# Patient Record
Sex: FEMALE | Race: WHITE | NOT HISPANIC OR LATINO | URBAN - METROPOLITAN AREA
[De-identification: names, ages, dates, MRNs, and addresses within clinical notes are randomized per-mention and may not be internally consistent; named-entity substitution may affect disease eponyms.]

---

## 2023-10-06 ENCOUNTER — OUTPATIENT (OUTPATIENT)
Dept: OUTPATIENT SERVICES | Facility: HOSPITAL | Age: 33
LOS: 1 days | End: 2023-10-06
Payer: COMMERCIAL

## 2023-10-06 VITALS
TEMPERATURE: 98 F | SYSTOLIC BLOOD PRESSURE: 105 MMHG | WEIGHT: 203.05 LBS | OXYGEN SATURATION: 97 % | DIASTOLIC BLOOD PRESSURE: 67 MMHG | RESPIRATION RATE: 17 BRPM | HEIGHT: 64 IN | HEART RATE: 96 BPM

## 2023-10-06 DIAGNOSIS — O02.1 MISSED ABORTION: ICD-10-CM

## 2023-10-06 DIAGNOSIS — Z01.818 ENCOUNTER FOR OTHER PREPROCEDURAL EXAMINATION: ICD-10-CM

## 2023-10-06 DIAGNOSIS — Z98.890 OTHER SPECIFIED POSTPROCEDURAL STATES: Chronic | ICD-10-CM

## 2023-10-06 DIAGNOSIS — K08.409 PARTIAL LOSS OF TEETH, UNSPECIFIED CAUSE, UNSPECIFIED CLASS: Chronic | ICD-10-CM

## 2023-10-06 LAB
ANION GAP SERPL CALC-SCNC: 13 MMOL/L — SIGNIFICANT CHANGE UP (ref 5–17)
APTT BLD: 35.1 SEC — SIGNIFICANT CHANGE UP (ref 24.5–35.6)
BLD GP AB SCN SERPL QL: NEGATIVE — SIGNIFICANT CHANGE UP
BUN SERPL-MCNC: 7 MG/DL — SIGNIFICANT CHANGE UP (ref 7–23)
CALCIUM SERPL-MCNC: 9.9 MG/DL — SIGNIFICANT CHANGE UP (ref 8.4–10.5)
CHLORIDE SERPL-SCNC: 103 MMOL/L — SIGNIFICANT CHANGE UP (ref 96–108)
CO2 SERPL-SCNC: 20 MMOL/L — LOW (ref 22–31)
CREAT SERPL-MCNC: 0.49 MG/DL — LOW (ref 0.5–1.3)
EGFR: 129 ML/MIN/1.73M2 — SIGNIFICANT CHANGE UP
FIBRINOGEN PPP-MCNC: 424 MG/DL — SIGNIFICANT CHANGE UP (ref 200–445)
GLUCOSE SERPL-MCNC: 90 MG/DL — SIGNIFICANT CHANGE UP (ref 70–99)
HCT VFR BLD CALC: 38.7 % — SIGNIFICANT CHANGE UP (ref 34.5–45)
HGB BLD-MCNC: 13.4 G/DL — SIGNIFICANT CHANGE UP (ref 11.5–15.5)
INR BLD: 1.1 RATIO — SIGNIFICANT CHANGE UP (ref 0.85–1.18)
MCHC RBC-ENTMCNC: 31.7 PG — SIGNIFICANT CHANGE UP (ref 27–34)
MCHC RBC-ENTMCNC: 34.6 GM/DL — SIGNIFICANT CHANGE UP (ref 32–36)
MCV RBC AUTO: 91.5 FL — SIGNIFICANT CHANGE UP (ref 80–100)
NRBC # BLD: 0 /100 WBCS — SIGNIFICANT CHANGE UP (ref 0–0)
PLATELET # BLD AUTO: 334 K/UL — SIGNIFICANT CHANGE UP (ref 150–400)
POTASSIUM SERPL-MCNC: 3.8 MMOL/L — SIGNIFICANT CHANGE UP (ref 3.5–5.3)
POTASSIUM SERPL-SCNC: 3.8 MMOL/L — SIGNIFICANT CHANGE UP (ref 3.5–5.3)
PROTHROM AB SERPL-ACNC: 11.5 SEC — SIGNIFICANT CHANGE UP (ref 9.5–13)
RBC # BLD: 4.23 M/UL — SIGNIFICANT CHANGE UP (ref 3.8–5.2)
RBC # FLD: 11.9 % — SIGNIFICANT CHANGE UP (ref 10.3–14.5)
RH IG SCN BLD-IMP: POSITIVE — SIGNIFICANT CHANGE UP
SODIUM SERPL-SCNC: 136 MMOL/L — SIGNIFICANT CHANGE UP (ref 135–145)
WBC # BLD: 9.08 K/UL — SIGNIFICANT CHANGE UP (ref 3.8–10.5)
WBC # FLD AUTO: 9.08 K/UL — SIGNIFICANT CHANGE UP (ref 3.8–10.5)

## 2023-10-06 PROCEDURE — 36415 COLL VENOUS BLD VENIPUNCTURE: CPT

## 2023-10-06 PROCEDURE — 85027 COMPLETE CBC AUTOMATED: CPT

## 2023-10-06 PROCEDURE — 80048 BASIC METABOLIC PNL TOTAL CA: CPT

## 2023-10-06 PROCEDURE — 85384 FIBRINOGEN ACTIVITY: CPT

## 2023-10-06 PROCEDURE — G0463: CPT

## 2023-10-06 PROCEDURE — 86901 BLOOD TYPING SEROLOGIC RH(D): CPT

## 2023-10-06 PROCEDURE — 85610 PROTHROMBIN TIME: CPT

## 2023-10-06 PROCEDURE — 85730 THROMBOPLASTIN TIME PARTIAL: CPT

## 2023-10-06 PROCEDURE — 86900 BLOOD TYPING SEROLOGIC ABO: CPT

## 2023-10-06 PROCEDURE — 86850 RBC ANTIBODY SCREEN: CPT

## 2023-10-06 RX ORDER — SODIUM CHLORIDE 9 MG/ML
1000 INJECTION, SOLUTION INTRAVENOUS
Refills: 0 | Status: DISCONTINUED | OUTPATIENT
Start: 2023-10-10 | End: 2023-10-24

## 2023-10-06 RX ORDER — SODIUM CHLORIDE 9 MG/ML
3 INJECTION INTRAMUSCULAR; INTRAVENOUS; SUBCUTANEOUS EVERY 8 HOURS
Refills: 0 | Status: DISCONTINUED | OUTPATIENT
Start: 2023-10-10 | End: 2023-10-24

## 2023-10-06 NOTE — H&P PST ADULT - ASSESSMENT
DASI score:  DASI activity:  Loose teeth or denture: denies DASI score: 8 mets  DASI activity: ADLs, walking, stairs   Loose teeth or denture: denies

## 2023-10-06 NOTE — H&P PST ADULT - PROBLEM SELECTOR PLAN 1
D&C FOR MISSED AB  Pre-op education provided - all questions answered   Per guideline CBC, T&S, coags sent in PST

## 2023-10-06 NOTE — H&P PST ADULT - HISTORY OF PRESENT ILLNESS
34 YO G P female presents to PST for D&C MISSED AB 10/10/2023. Denies any palpitations, SOB, N/V, fever or chills.  34 YO  female LMP 2023 @ 14w gestation, u/s performed 10/3 revealed no fetal heartbeat, pt stated "they think the baby passed at 11w," presents to PST for D&C MISSED AB 10/10/2023. Denies any palpitations, SOB, N/V, fever or chills.

## 2023-10-06 NOTE — H&P PST ADULT - ENMT
Neuros: A&Ox4, cooperative with cares.   Cardiac: WNL, denies chest pain.   Respiratory: On RA denies SOB.   Pain: Denied  Nausea: Denies N/V  Diet: Regular diet, 1500 Fluid restriction. Good appetite  GI/: Per patient doesn't void  Skin/Incisions/Drains: Orders for wound change. Patient eating deferred.   Lines: L PIV SL  Labs: hgb was 9.0  Activity: Activity encouraged, Up independent. Ambulate to chair and back to bed.    New Changes: Dialysis 3L out today  Plan: Continue POC.    negative mouth

## 2023-10-06 NOTE — H&P PST ADULT - NSICDXPASTSURGICALHX_GEN_ALL_CORE_FT
PAST SURGICAL HISTORY:  One previous induced termination of pregnancy     [place] teeth extracted

## 2023-10-10 ENCOUNTER — OUTPATIENT (OUTPATIENT)
Dept: OUTPATIENT SERVICES | Facility: HOSPITAL | Age: 33
LOS: 1 days | End: 2023-10-10
Payer: COMMERCIAL

## 2023-10-10 VITALS
HEIGHT: 64 IN | OXYGEN SATURATION: 99 % | DIASTOLIC BLOOD PRESSURE: 83 MMHG | RESPIRATION RATE: 18 BRPM | HEART RATE: 95 BPM | SYSTOLIC BLOOD PRESSURE: 135 MMHG | WEIGHT: 203.05 LBS | TEMPERATURE: 98 F

## 2023-10-10 VITALS
HEART RATE: 76 BPM | RESPIRATION RATE: 17 BRPM | OXYGEN SATURATION: 97 % | DIASTOLIC BLOOD PRESSURE: 77 MMHG | SYSTOLIC BLOOD PRESSURE: 117 MMHG | TEMPERATURE: 97 F

## 2023-10-10 DIAGNOSIS — K08.409 PARTIAL LOSS OF TEETH, UNSPECIFIED CAUSE, UNSPECIFIED CLASS: Chronic | ICD-10-CM

## 2023-10-10 DIAGNOSIS — Z98.890 OTHER SPECIFIED POSTPROCEDURAL STATES: Chronic | ICD-10-CM

## 2023-10-10 DIAGNOSIS — O02.1 MISSED ABORTION: ICD-10-CM

## 2023-10-10 PROCEDURE — 86900 BLOOD TYPING SEROLOGIC ABO: CPT

## 2023-10-10 PROCEDURE — 59820 CARE OF MISCARRIAGE: CPT

## 2023-10-10 PROCEDURE — 88305 TISSUE EXAM BY PATHOLOGIST: CPT

## 2023-10-10 PROCEDURE — 88233 TISSUE CULTURE SKIN/BIOPSY: CPT

## 2023-10-10 PROCEDURE — 88291 CYTO/MOLECULAR REPORT: CPT | Mod: 1L

## 2023-10-10 PROCEDURE — 86901 BLOOD TYPING SEROLOGIC RH(D): CPT

## 2023-10-10 PROCEDURE — 86850 RBC ANTIBODY SCREEN: CPT

## 2023-10-10 PROCEDURE — 88280 CHROMOSOME KARYOTYPE STUDY: CPT

## 2023-10-10 PROCEDURE — 88264 CHROMOSOME ANALYSIS 20-25: CPT

## 2023-10-10 PROCEDURE — 88305 TISSUE EXAM BY PATHOLOGIST: CPT | Mod: 26

## 2023-10-10 RX ORDER — LIDOCAINE HCL 20 MG/ML
0.2 VIAL (ML) INJECTION ONCE
Refills: 0 | Status: COMPLETED | OUTPATIENT
Start: 2023-10-10 | End: 2023-10-10

## 2023-10-10 RX ORDER — OXYCODONE HYDROCHLORIDE 5 MG/1
5 TABLET ORAL ONCE
Refills: 0 | Status: DISCONTINUED | OUTPATIENT
Start: 2023-10-10 | End: 2023-10-10

## 2023-10-10 RX ORDER — ONDANSETRON 8 MG/1
4 TABLET, FILM COATED ORAL ONCE
Refills: 0 | Status: DISCONTINUED | OUTPATIENT
Start: 2023-10-10 | End: 2023-10-24

## 2023-10-10 RX ORDER — IBUPROFEN 200 MG
1 TABLET ORAL
Qty: 0 | Refills: 0 | DISCHARGE

## 2023-10-10 RX ORDER — ACETAMINOPHEN 500 MG
3 TABLET ORAL
Qty: 0 | Refills: 0 | DISCHARGE

## 2023-10-10 RX ORDER — BACILLUS COAGULANS/INULIN 1B-250 MG
1 CAPSULE ORAL
Refills: 0 | DISCHARGE

## 2023-10-10 RX ADMIN — OXYCODONE HYDROCHLORIDE 5 MILLIGRAM(S): 5 TABLET ORAL at 12:57

## 2023-10-10 RX ADMIN — SODIUM CHLORIDE 100 MILLILITER(S): 9 INJECTION, SOLUTION INTRAVENOUS at 11:15

## 2023-10-10 RX ADMIN — OXYCODONE HYDROCHLORIDE 5 MILLIGRAM(S): 5 TABLET ORAL at 12:37

## 2023-10-10 NOTE — ASU DISCHARGE PLAN (ADULT/PEDIATRIC) - CARE PROVIDER_API CALL
Tyrese Martniez  Obstetrics and Gynecology  [address] [place] [address]  Phone: [phone]  Fax: [phone]  Follow Up Time: 2 weeks

## 2023-10-10 NOTE — BRIEF OPERATIVE NOTE - NSICDXBRIEFPROCEDURE_GEN_ALL_CORE_FT
PROCEDURES:  Hysteroscopy, with cervical dilation of and curettage procedure, using suction 10-Oct-2023 12:38:18  Zabrina Hicks

## 2023-10-10 NOTE — ASU DISCHARGE PLAN (ADULT/PEDIATRIC) - MEDICATION INSTRUCTIONS
Every 3 hours alternate taking ibuprofen and tylenol as needed for pain. Do not take more than 2 doses of the same medication in a 6 hour period

## 2023-10-10 NOTE — ASU DISCHARGE PLAN (ADULT/PEDIATRIC) - ASU DC SPECIAL INSTRUCTIONSFT
Expect abdominal cramping/pain and spotting for the next weeks. Take ibuprofen and Tylenol for cramping. Use a pad as needed. Call your physician or go to the emergency room if you experience any of the following: heavy vaginal bleeding (soaking more than 2 pads in 1 hour for 2 hours), fever, chills, nausea, vomiting, or pain that is not controlled by medication. Follow-up with Dr. Martinez in 2 weeks.

## 2023-10-10 NOTE — ASU PREOP CHECKLIST - NSSDAENDDT_GEN_ALL_CORE
You were seen in the emergency department for a hemorrhoid. Please see the attached sheet for more information.    Please follow-up with the colorectal surgeon.    You can use 500-1000mg Tylenol every 6 hours for pain - as needed.  This is an over-the-counter medications - please respect the warnings on the label. This medication come with certain risks and side effects that you need to discuss with your doctor, especially if you are taking it for a prolonged period.    Return to the ED if you have worsening pain, increased bleeding, fevers, unable to have a bowel movement, or unable to tolerate food or liquids.
10-Oct-2023 11:36

## 2023-10-10 NOTE — ASU DISCHARGE PLAN (ADULT/PEDIATRIC) - NS MD DC FALL RISK RISK
For information on Fall & Injury Prevention, visit: [email or web address] OR  [email or web address] OR  https://www.cdc.gov/steadi/patient.html

## 2023-10-10 NOTE — ASU PATIENT PROFILE, ADULT - NSICDXPASTSURGICALHX_GEN_ALL_CORE_FT
PAST SURGICAL HISTORY:  One previous induced termination of pregnancy     [place] teeth extracted

## 2023-10-16 LAB — SURGICAL PATHOLOGY STUDY: SIGNIFICANT CHANGE UP

## 2023-10-27 LAB
CHROM ANALY OVERALL INTERP SPEC-IMP: SIGNIFICANT CHANGE UP
CHROM ANALY OVERALL INTERP SPEC-IMP: SIGNIFICANT CHANGE UP

## 2023-11-01 ENCOUNTER — EMERGENCY (EMERGENCY)
Facility: HOSPITAL | Age: 33
LOS: 1 days | Discharge: HOME | End: 2023-11-01
Attending: EMERGENCY MEDICINE
Payer: COMMERCIAL

## 2023-11-01 VITALS
HEIGHT: 64 IN | OXYGEN SATURATION: 98 % | HEART RATE: 92 BPM | SYSTOLIC BLOOD PRESSURE: 131 MMHG | TEMPERATURE: 98 F | WEIGHT: 199.96 LBS | DIASTOLIC BLOOD PRESSURE: 80 MMHG | RESPIRATION RATE: 20 BRPM

## 2023-11-01 DIAGNOSIS — Z98.890 OTHER SPECIFIED POSTPROCEDURAL STATES: Chronic | ICD-10-CM

## 2023-11-01 DIAGNOSIS — K08.409 PARTIAL LOSS OF TEETH, UNSPECIFIED CAUSE, UNSPECIFIED CLASS: Chronic | ICD-10-CM

## 2023-11-01 PROCEDURE — 99285 EMERGENCY DEPT VISIT HI MDM: CPT

## 2023-11-02 VITALS
HEART RATE: 85 BPM | DIASTOLIC BLOOD PRESSURE: 72 MMHG | OXYGEN SATURATION: 100 % | RESPIRATION RATE: 18 BRPM | SYSTOLIC BLOOD PRESSURE: 112 MMHG | TEMPERATURE: 98 F

## 2023-11-02 PROBLEM — F90.9 ATTENTION-DEFICIT HYPERACTIVITY DISORDER, UNSPECIFIED TYPE: Chronic | Status: ACTIVE | Noted: 2023-10-06

## 2023-11-02 PROBLEM — Z34.90 ENCOUNTER FOR SUPERVISION OF NORMAL PREGNANCY, UNSPECIFIED, UNSPECIFIED TRIMESTER: Chronic | Status: ACTIVE | Noted: 2023-10-06

## 2023-11-02 LAB
ALBUMIN SERPL ELPH-MCNC: 4.4 G/DL — SIGNIFICANT CHANGE UP (ref 3.3–5)
ALBUMIN SERPL ELPH-MCNC: 4.4 G/DL — SIGNIFICANT CHANGE UP (ref 3.3–5)
ALP SERPL-CCNC: 97 U/L — SIGNIFICANT CHANGE UP (ref 40–120)
ALP SERPL-CCNC: 97 U/L — SIGNIFICANT CHANGE UP (ref 40–120)
ALT FLD-CCNC: 10 U/L — SIGNIFICANT CHANGE UP (ref 10–45)
ALT FLD-CCNC: 10 U/L — SIGNIFICANT CHANGE UP (ref 10–45)
ANION GAP SERPL CALC-SCNC: 12 MMOL/L — SIGNIFICANT CHANGE UP (ref 5–17)
ANION GAP SERPL CALC-SCNC: 12 MMOL/L — SIGNIFICANT CHANGE UP (ref 5–17)
APPEARANCE UR: ABNORMAL
APPEARANCE UR: ABNORMAL
APPEARANCE UR: CLEAR — SIGNIFICANT CHANGE UP
APPEARANCE UR: CLEAR — SIGNIFICANT CHANGE UP
AST SERPL-CCNC: 15 U/L — SIGNIFICANT CHANGE UP (ref 10–40)
AST SERPL-CCNC: 15 U/L — SIGNIFICANT CHANGE UP (ref 10–40)
BACTERIA # UR AUTO: ABNORMAL /HPF
BASOPHILS # BLD AUTO: 0.04 K/UL — SIGNIFICANT CHANGE UP (ref 0–0.2)
BASOPHILS # BLD AUTO: 0.04 K/UL — SIGNIFICANT CHANGE UP (ref 0–0.2)
BASOPHILS NFR BLD AUTO: 0.3 % — SIGNIFICANT CHANGE UP (ref 0–2)
BASOPHILS NFR BLD AUTO: 0.3 % — SIGNIFICANT CHANGE UP (ref 0–2)
BILIRUB SERPL-MCNC: 0.3 MG/DL — SIGNIFICANT CHANGE UP (ref 0.2–1.2)
BILIRUB SERPL-MCNC: 0.3 MG/DL — SIGNIFICANT CHANGE UP (ref 0.2–1.2)
BILIRUB UR-MCNC: NEGATIVE — SIGNIFICANT CHANGE UP
BLD GP AB SCN SERPL QL: NEGATIVE — SIGNIFICANT CHANGE UP
BLD GP AB SCN SERPL QL: NEGATIVE — SIGNIFICANT CHANGE UP
BUN SERPL-MCNC: 11 MG/DL — SIGNIFICANT CHANGE UP (ref 7–23)
BUN SERPL-MCNC: 11 MG/DL — SIGNIFICANT CHANGE UP (ref 7–23)
CALCIUM SERPL-MCNC: 9.5 MG/DL — SIGNIFICANT CHANGE UP (ref 8.4–10.5)
CALCIUM SERPL-MCNC: 9.5 MG/DL — SIGNIFICANT CHANGE UP (ref 8.4–10.5)
CAST: 0 /LPF — SIGNIFICANT CHANGE UP (ref 0–4)
CAST: 0 /LPF — SIGNIFICANT CHANGE UP (ref 0–4)
CAST: 2 /LPF — SIGNIFICANT CHANGE UP (ref 0–4)
CAST: 2 /LPF — SIGNIFICANT CHANGE UP (ref 0–4)
CHLORIDE SERPL-SCNC: 103 MMOL/L — SIGNIFICANT CHANGE UP (ref 96–108)
CHLORIDE SERPL-SCNC: 103 MMOL/L — SIGNIFICANT CHANGE UP (ref 96–108)
CO2 SERPL-SCNC: 24 MMOL/L — SIGNIFICANT CHANGE UP (ref 22–31)
CO2 SERPL-SCNC: 24 MMOL/L — SIGNIFICANT CHANGE UP (ref 22–31)
COLOR SPEC: YELLOW — SIGNIFICANT CHANGE UP
CREAT SERPL-MCNC: 0.72 MG/DL — SIGNIFICANT CHANGE UP (ref 0.5–1.3)
CREAT SERPL-MCNC: 0.72 MG/DL — SIGNIFICANT CHANGE UP (ref 0.5–1.3)
DIFF PNL FLD: ABNORMAL
EGFR: 115 ML/MIN/1.73M2 — SIGNIFICANT CHANGE UP
EGFR: 115 ML/MIN/1.73M2 — SIGNIFICANT CHANGE UP
EOSINOPHIL # BLD AUTO: 0.17 K/UL — SIGNIFICANT CHANGE UP (ref 0–0.5)
EOSINOPHIL # BLD AUTO: 0.17 K/UL — SIGNIFICANT CHANGE UP (ref 0–0.5)
EOSINOPHIL NFR BLD AUTO: 1.4 % — SIGNIFICANT CHANGE UP (ref 0–6)
EOSINOPHIL NFR BLD AUTO: 1.4 % — SIGNIFICANT CHANGE UP (ref 0–6)
GLUCOSE SERPL-MCNC: 102 MG/DL — HIGH (ref 70–99)
GLUCOSE SERPL-MCNC: 102 MG/DL — HIGH (ref 70–99)
GLUCOSE UR QL: NEGATIVE MG/DL — SIGNIFICANT CHANGE UP
HCG SERPL-ACNC: 2 MIU/ML — SIGNIFICANT CHANGE UP
HCG SERPL-ACNC: 2 MIU/ML — SIGNIFICANT CHANGE UP
HCT VFR BLD CALC: 36.2 % — SIGNIFICANT CHANGE UP (ref 34.5–45)
HCT VFR BLD CALC: 36.2 % — SIGNIFICANT CHANGE UP (ref 34.5–45)
HCT VFR BLD CALC: 37.4 % — SIGNIFICANT CHANGE UP (ref 34.5–45)
HCT VFR BLD CALC: 37.4 % — SIGNIFICANT CHANGE UP (ref 34.5–45)
HGB BLD-MCNC: 12.2 G/DL — SIGNIFICANT CHANGE UP (ref 11.5–15.5)
HGB BLD-MCNC: 12.2 G/DL — SIGNIFICANT CHANGE UP (ref 11.5–15.5)
HGB BLD-MCNC: 12.5 G/DL — SIGNIFICANT CHANGE UP (ref 11.5–15.5)
HGB BLD-MCNC: 12.5 G/DL — SIGNIFICANT CHANGE UP (ref 11.5–15.5)
IMM GRANULOCYTES NFR BLD AUTO: 0.2 % — SIGNIFICANT CHANGE UP (ref 0–0.9)
IMM GRANULOCYTES NFR BLD AUTO: 0.2 % — SIGNIFICANT CHANGE UP (ref 0–0.9)
KETONES UR-MCNC: NEGATIVE MG/DL — SIGNIFICANT CHANGE UP
LEUKOCYTE ESTERASE UR-ACNC: ABNORMAL
LEUKOCYTE ESTERASE UR-ACNC: ABNORMAL
LEUKOCYTE ESTERASE UR-ACNC: NEGATIVE — SIGNIFICANT CHANGE UP
LEUKOCYTE ESTERASE UR-ACNC: NEGATIVE — SIGNIFICANT CHANGE UP
LIDOCAIN IGE QN: 16 U/L — SIGNIFICANT CHANGE UP (ref 7–60)
LIDOCAIN IGE QN: 16 U/L — SIGNIFICANT CHANGE UP (ref 7–60)
LYMPHOCYTES # BLD AUTO: 27 % — SIGNIFICANT CHANGE UP (ref 13–44)
LYMPHOCYTES # BLD AUTO: 27 % — SIGNIFICANT CHANGE UP (ref 13–44)
LYMPHOCYTES # BLD AUTO: 3.29 K/UL — SIGNIFICANT CHANGE UP (ref 1–3.3)
LYMPHOCYTES # BLD AUTO: 3.29 K/UL — SIGNIFICANT CHANGE UP (ref 1–3.3)
MCHC RBC-ENTMCNC: 31.1 PG — SIGNIFICANT CHANGE UP (ref 27–34)
MCHC RBC-ENTMCNC: 31.1 PG — SIGNIFICANT CHANGE UP (ref 27–34)
MCHC RBC-ENTMCNC: 31.3 PG — SIGNIFICANT CHANGE UP (ref 27–34)
MCHC RBC-ENTMCNC: 31.3 PG — SIGNIFICANT CHANGE UP (ref 27–34)
MCHC RBC-ENTMCNC: 33.4 GM/DL — SIGNIFICANT CHANGE UP (ref 32–36)
MCHC RBC-ENTMCNC: 33.4 GM/DL — SIGNIFICANT CHANGE UP (ref 32–36)
MCHC RBC-ENTMCNC: 33.7 GM/DL — SIGNIFICANT CHANGE UP (ref 32–36)
MCHC RBC-ENTMCNC: 33.7 GM/DL — SIGNIFICANT CHANGE UP (ref 32–36)
MCV RBC AUTO: 92.3 FL — SIGNIFICANT CHANGE UP (ref 80–100)
MCV RBC AUTO: 92.3 FL — SIGNIFICANT CHANGE UP (ref 80–100)
MCV RBC AUTO: 93.7 FL — SIGNIFICANT CHANGE UP (ref 80–100)
MCV RBC AUTO: 93.7 FL — SIGNIFICANT CHANGE UP (ref 80–100)
MONOCYTES # BLD AUTO: 0.94 K/UL — HIGH (ref 0–0.9)
MONOCYTES # BLD AUTO: 0.94 K/UL — HIGH (ref 0–0.9)
MONOCYTES NFR BLD AUTO: 7.7 % — SIGNIFICANT CHANGE UP (ref 2–14)
MONOCYTES NFR BLD AUTO: 7.7 % — SIGNIFICANT CHANGE UP (ref 2–14)
NEUTROPHILS # BLD AUTO: 7.71 K/UL — HIGH (ref 1.8–7.4)
NEUTROPHILS # BLD AUTO: 7.71 K/UL — HIGH (ref 1.8–7.4)
NEUTROPHILS NFR BLD AUTO: 63.4 % — SIGNIFICANT CHANGE UP (ref 43–77)
NEUTROPHILS NFR BLD AUTO: 63.4 % — SIGNIFICANT CHANGE UP (ref 43–77)
NITRITE UR-MCNC: NEGATIVE — SIGNIFICANT CHANGE UP
NRBC # BLD: 0 /100 WBCS — SIGNIFICANT CHANGE UP (ref 0–0)
PH UR: 5.5 — SIGNIFICANT CHANGE UP (ref 5–8)
PLATELET # BLD AUTO: 312 K/UL — SIGNIFICANT CHANGE UP (ref 150–400)
PLATELET # BLD AUTO: 312 K/UL — SIGNIFICANT CHANGE UP (ref 150–400)
PLATELET # BLD AUTO: 328 K/UL — SIGNIFICANT CHANGE UP (ref 150–400)
PLATELET # BLD AUTO: 328 K/UL — SIGNIFICANT CHANGE UP (ref 150–400)
POTASSIUM SERPL-MCNC: 3.8 MMOL/L — SIGNIFICANT CHANGE UP (ref 3.5–5.3)
POTASSIUM SERPL-MCNC: 3.8 MMOL/L — SIGNIFICANT CHANGE UP (ref 3.5–5.3)
POTASSIUM SERPL-SCNC: 3.8 MMOL/L — SIGNIFICANT CHANGE UP (ref 3.5–5.3)
POTASSIUM SERPL-SCNC: 3.8 MMOL/L — SIGNIFICANT CHANGE UP (ref 3.5–5.3)
PROT SERPL-MCNC: 7.1 G/DL — SIGNIFICANT CHANGE UP (ref 6–8.3)
PROT SERPL-MCNC: 7.1 G/DL — SIGNIFICANT CHANGE UP (ref 6–8.3)
PROT UR-MCNC: 30 MG/DL
PROT UR-MCNC: 30 MG/DL
PROT UR-MCNC: NEGATIVE MG/DL — SIGNIFICANT CHANGE UP
PROT UR-MCNC: NEGATIVE MG/DL — SIGNIFICANT CHANGE UP
RBC # BLD: 3.92 M/UL — SIGNIFICANT CHANGE UP (ref 3.8–5.2)
RBC # BLD: 3.92 M/UL — SIGNIFICANT CHANGE UP (ref 3.8–5.2)
RBC # BLD: 3.99 M/UL — SIGNIFICANT CHANGE UP (ref 3.8–5.2)
RBC # BLD: 3.99 M/UL — SIGNIFICANT CHANGE UP (ref 3.8–5.2)
RBC # FLD: 12 % — SIGNIFICANT CHANGE UP (ref 10.3–14.5)
RBC # FLD: 12 % — SIGNIFICANT CHANGE UP (ref 10.3–14.5)
RBC # FLD: 12.2 % — SIGNIFICANT CHANGE UP (ref 10.3–14.5)
RBC # FLD: 12.2 % — SIGNIFICANT CHANGE UP (ref 10.3–14.5)
RBC CASTS # UR COMP ASSIST: 26 /HPF — HIGH (ref 0–4)
RBC CASTS # UR COMP ASSIST: 26 /HPF — HIGH (ref 0–4)
RBC CASTS # UR COMP ASSIST: 6 /HPF — HIGH (ref 0–4)
RBC CASTS # UR COMP ASSIST: 6 /HPF — HIGH (ref 0–4)
REVIEW: SIGNIFICANT CHANGE UP
RH IG SCN BLD-IMP: POSITIVE — SIGNIFICANT CHANGE UP
RH IG SCN BLD-IMP: POSITIVE — SIGNIFICANT CHANGE UP
SODIUM SERPL-SCNC: 139 MMOL/L — SIGNIFICANT CHANGE UP (ref 135–145)
SODIUM SERPL-SCNC: 139 MMOL/L — SIGNIFICANT CHANGE UP (ref 135–145)
SP GR SPEC: 1.02 — SIGNIFICANT CHANGE UP (ref 1–1.03)
SP GR SPEC: 1.02 — SIGNIFICANT CHANGE UP (ref 1–1.03)
SP GR SPEC: >1.05 — SIGNIFICANT CHANGE UP (ref 1–1.03)
SP GR SPEC: >1.05 — SIGNIFICANT CHANGE UP (ref 1–1.03)
SQUAMOUS # UR AUTO: 10 /HPF — HIGH (ref 0–5)
SQUAMOUS # UR AUTO: 10 /HPF — HIGH (ref 0–5)
SQUAMOUS # UR AUTO: 4 /HPF — SIGNIFICANT CHANGE UP (ref 0–5)
SQUAMOUS # UR AUTO: 4 /HPF — SIGNIFICANT CHANGE UP (ref 0–5)
UROBILINOGEN FLD QL: 0.2 MG/DL — SIGNIFICANT CHANGE UP (ref 0.2–1)
WBC # BLD: 12.18 K/UL — HIGH (ref 3.8–10.5)
WBC # BLD: 12.18 K/UL — HIGH (ref 3.8–10.5)
WBC # BLD: 9.15 K/UL — SIGNIFICANT CHANGE UP (ref 3.8–10.5)
WBC # BLD: 9.15 K/UL — SIGNIFICANT CHANGE UP (ref 3.8–10.5)
WBC # FLD AUTO: 12.18 K/UL — HIGH (ref 3.8–10.5)
WBC # FLD AUTO: 12.18 K/UL — HIGH (ref 3.8–10.5)
WBC # FLD AUTO: 9.15 K/UL — SIGNIFICANT CHANGE UP (ref 3.8–10.5)
WBC # FLD AUTO: 9.15 K/UL — SIGNIFICANT CHANGE UP (ref 3.8–10.5)
WBC UR QL: 12 /HPF — HIGH (ref 0–5)
WBC UR QL: 12 /HPF — HIGH (ref 0–5)
WBC UR QL: 6 /HPF — HIGH (ref 0–5)
WBC UR QL: 6 /HPF — HIGH (ref 0–5)

## 2023-11-02 PROCEDURE — 86900 BLOOD TYPING SEROLOGIC ABO: CPT

## 2023-11-02 PROCEDURE — 36415 COLL VENOUS BLD VENIPUNCTURE: CPT

## 2023-11-02 PROCEDURE — 87086 URINE CULTURE/COLONY COUNT: CPT

## 2023-11-02 PROCEDURE — 85027 COMPLETE CBC AUTOMATED: CPT

## 2023-11-02 PROCEDURE — 80053 COMPREHEN METABOLIC PANEL: CPT

## 2023-11-02 PROCEDURE — 86901 BLOOD TYPING SEROLOGIC RH(D): CPT

## 2023-11-02 PROCEDURE — 83690 ASSAY OF LIPASE: CPT

## 2023-11-02 PROCEDURE — 93975 VASCULAR STUDY: CPT | Mod: 26

## 2023-11-02 PROCEDURE — 99285 EMERGENCY DEPT VISIT HI MDM: CPT | Mod: 25

## 2023-11-02 PROCEDURE — 74177 CT ABD & PELVIS W/CONTRAST: CPT | Mod: MA

## 2023-11-02 PROCEDURE — 76830 TRANSVAGINAL US NON-OB: CPT

## 2023-11-02 PROCEDURE — 85025 COMPLETE CBC W/AUTO DIFF WBC: CPT

## 2023-11-02 PROCEDURE — 81001 URINALYSIS AUTO W/SCOPE: CPT

## 2023-11-02 PROCEDURE — 93975 VASCULAR STUDY: CPT

## 2023-11-02 PROCEDURE — 86850 RBC ANTIBODY SCREEN: CPT

## 2023-11-02 PROCEDURE — 76830 TRANSVAGINAL US NON-OB: CPT | Mod: 26

## 2023-11-02 PROCEDURE — 84702 CHORIONIC GONADOTROPIN TEST: CPT

## 2023-11-02 PROCEDURE — 74177 CT ABD & PELVIS W/CONTRAST: CPT | Mod: 26,MA

## 2023-11-02 RX ORDER — ACETAMINOPHEN 500 MG
975 TABLET ORAL ONCE
Refills: 0 | Status: COMPLETED | OUTPATIENT
Start: 2023-11-02 | End: 2023-11-02

## 2023-11-02 RX ADMIN — Medication 975 MILLIGRAM(S): at 01:31

## 2023-11-02 RX ADMIN — Medication 975 MILLIGRAM(S): at 10:27

## 2023-11-02 NOTE — ED PROVIDER NOTE - PATIENT PORTAL LINK FT
You can access the FollowMyHealth Patient Portal offered by [facility] by registering at the following website: [email or web address]. By joining [employer]’s FollowMyHealth portal, you will also be able to view your health information using other applications (apps) compatible with our system.

## 2023-11-02 NOTE — CONSULT NOTE ADULT - ASSESSMENT
33y G2P s/p dilation and curettage for MAB on 10/10, uncomplicated, presents with RLQ pain. Patient now with minimal pain after Tylenol, VSS and benign exam. TVUS unremarkable. HCG neg. Pain likely MSK in nature, no evidence of GYN origin. Patient overall healing well postoperatively.    - No acute GYN intervention    Tere Hemphill, PGY-4  d/w Dr Martinez

## 2023-11-02 NOTE — ED PROVIDER NOTE - PROGRESS NOTE DETAILS
Mahesh PGY3  Patient signed out to me pending TVUS. In summary 33F with history of recent D&C presents with RLQ pain. Workup so far revealed mild leukocytosis, normal H/H, normal electrolytes, normal kidney/liver function and negative hCG. UA not concerning for UTI at this time. CTAp did not show acute pathologies. Mahesh PGY3  TVUS findings concerning for R hemorrhagic cyst with small amount of complex free fluid; in the setting of negative hCG, low suspicion for retained products. Discussed results with patient and patient reports that her pain is much better after tylenol. Will repeat CBC and OBGYN consulted. Mahesh PGY3  TVUS findings concerning for R hemorrhagic cyst with small amount of complex free fluid; in the setting of negative hCG, low suspicion for retained products. Discussed results with patient and patient reports that her pain is much better after tylenol. Will repeat CBC and OBGYN consulted. OBGYN dr. Tyrese Martinez Mahesh PGY3  Rpt H/H stable. Mahesh PGY3  Discussed with OBGYN - no interventions or further recommendation per their perspective. Discussed with patient and  at bedside and recommends OBGYN follow up for further management.

## 2023-11-02 NOTE — ED PROVIDER NOTE - CARE PROVIDER_API CALL
Tyrese Martinez  Obstetrics and Gynecology  [address] [place] [address]  Phone: [phone]  Fax: [phone]  Follow Up Time:

## 2023-11-02 NOTE — ED PROVIDER NOTE - OBJECTIVE STATEMENT
32 y/o female, recent D/C last month after a miscarriage, presents to the ER with complaint of RLQ abdominal pain. states pain started today.  states pain does not radiate. states has been constant. states pain at this time is 5/10. denies f/n/v/d, CP, SOB, HA, dizziness, urinary symptoms, vaginal bleeding, cough.

## 2023-11-02 NOTE — CONSULT NOTE ADULT - SUBJECTIVE AND OBJECTIVE BOX
GYN Consult Note    HPI:  33y G2P s/p dilation and curettage for MAB on 10/10, uncomplicated, presents with RLQ pain. Pt healing well poostop until yesterday night developed aching deep RLQ pain, intermittently sharp. She went to urgent care who rec she come to ED to r/o appy.     In ED, pt with VSS. CT AP neg for appy. TVUS unremarkable except 1.4cm paratubal / paraovarian cyst (a common benign finding). Currently pain 1/10 after Tylenol No vaginal bleeding, fevers, chills, nausea or vomiting.    Name of GYN Physician:    ObHx: TOP s/p dilation and curettage x1, MAB for dilation and curettage     PAST MEDICAL & SURGICAL HISTORY:      ADHD      [place] teeth extracted        Allergies    cortisone (Other)  bees - anaphalaxis (Other)    Intolerances    Gluten (Rash)      REVIEW OF SYSTEMS  General: denies fevers, chills, tiredness  Skin/Breast: denies breast pain  Respiratory and Thorax: denies shortness of breath, denies cough  Cardiovascular: denies chest pain and denies palpitations  Gastrointestinal: denies abdominal pain, nausea/ vomiting	  Genitourinary: denies dysuria, increased urinary frequency, urgency	  Constitutional, Cardiovascular, Respiratory, Gastrointestinal, Genitourinary, Musculoskeletal and Integumentary review of systems are normal except as noted. 	      Vital Signs Last 24 Hrs  T(C): 36.8 (02 Nov 2023 07:47), Max: 36.8 (01 Nov 2023 20:32)  T(F): 98.2 (02 Nov 2023 07:47), Max: 98.3 (02 Nov 2023 01:03)  HR: 85 (02 Nov 2023 07:47) (85 - 92)  BP: 107/76 (02 Nov 2023 07:47) (107/76 - 131/80)  BP(mean): --  RR: 18 (02 Nov 2023 07:47) (18 - 20)  SpO2: 100% (02 Nov 2023 07:47) (98% - 100%)    Parameters below as of 02 Nov 2023 07:47  Patient On (Oxygen Delivery Method): room air            PHYSICAL EXAM:   Gen: Comfortable, NAD  Psych: appropriate mood & affect  CV: RRR  Pulm: CTAB  Abd: Soft, ND, mild TTP in RLQ otherwise NT with no rebound or guarding   Ext: Warm & well perfused. No edema or tenderness bilaterally    LABS:                        12.2   9.15  )-----------( 312      ( 02 Nov 2023 09:11 )             36.2     11-02    139  |  103  |  11  ----------------------------<  102<H>  3.8   |  24  |  0.72    Ca    9.5      02 Nov 2023 01:19    TPro  7.1  /  Alb  4.4  /  TBili  0.3  /  DBili  x   /  AST  15  /  ALT  10  /  AlkPhos  97  11-02      Urinalysis Basic - ( 02 Nov 2023 05:27 )    Color: Yellow / Appearance: Clear / SG: >1.050 / pH: x  Gluc: x / Ketone: Negative mg/dL  / Bili: Negative / Urobili: 0.2 mg/dL   Blood: x / Protein: 30 mg/dL / Nitrite: Negative   Leuk Esterase: Negative / RBC: 26 /HPF / WBC 6 /HPF   Sq Epi: x / Non Sq Epi: 4 /HPF / Bacteria: Few /HPF        Blood Type: O Positive        RADIOLOGY & ADDITIONAL STUDIES:  < from: US Doppler Pelvis (11.02.23 @ 08:11) >    ACC: [id number] EXAM:  US DPLX PELVIC   ORDERED BY: LADAN JUAREZ     ACC: [id number] EXAM:  US TRANSVAGINAL   ORDERED BY: LADAN JUAREZ     PROCEDURE DATE:  11/02/2023          INTERPRETATION:  CLINICAL INFORMATION: 33-year-old woman with right   pelvic pain. History of miscarriage one month ago status post D&C    LMP: D&C last month    COMPARISON: Abdominal pelvic CT 11/2/2023.    TECHNIQUE:  Endovaginal and transabdominal pelvic sonogram. Color and Spectral   Doppler was performed.    FINDINGS:  Uterus: 7.7 cm x 5.5 cm x 4.7 cm. The uterus is retroverted. No fibroids   are identified. Multiple small nabothian cysts present within the cervix.  Endometrium: 10 mm. The endometrium is heterogeneous in echotexture. No   discrete, focal finding is visualized. Arterial and venous flow is   demonstrated within the endometrium with color and spectral Doppler.    Right ovary: 2.6 cm x 2.5 cm x 2.5 cm. Within normal limits, containing    small follicles.. Normal arterial and venous waveforms.  Left ovary: 2.4 cm x 1.8 cm x 2.2 cm. Within normal limits, containing   small follicles.. Normal arterial and venous waveforms.    Within the right adnexa separate from the ovary, there is a thin-walled,   anechoic, avascular cystic structure measuring 1.4 x 1.0 cm. This may   represent a paratubal/parovarian cyst.    Fluid: Small amount of free fluid within the right adnexa and cul-de-sac    which appears mildly complex with low-level internal echoes.    IMPRESSION:    1. The ovaries appear unremarkable, with normal Doppler flow.  2. Thin-walled anechoic avascular cystic structure measuring 1.4 cm   within the right adnexa may represent small paratubal/parovarian cyst.  3. Small amount of complex free fluid with low-level echoes within the  right adnexa and cul-de-sac.  4. The endometrium measures 1.0 cm in thickness and is heterogeneous in   echotexture with Doppler flow demonstrated. Suggest correlation with beta   hCG levels. In the correct clinical setting, retained products of   conception could not be excluded.  Follow-up ultrasound to re-evaluate is   recommended.    Findings were discussed with Dr. Aragon 11/2/2023 8:20 AM by Dr. Mojica   with read back confirmation.    --- End of Report ---        < end of copied text >  < from: CT Abdomen and Pelvis w/ IV Cont (11.02.23 @ 04:47) >  REPRODUCTIVE ORGANS: Uterus and adnexa within normal limits.    BOWEL: No bowel obstruction. Appendix is not visualized. No evidence of   inflammation in the pericecal region.  PERITONEUM: No ascites.  VESSELS: Within normal limits.  RETROPERITONEUM/LYMPH NODES: No lymphadenopathy.  ABDOMINAL WALL: Within normal limits.  BONES: Within normal limits.    IMPRESSION:  No acute abdominopelvic abnormality.        --- End of Report ---        < end of copied text >    x

## 2023-11-02 NOTE — ED PROVIDER NOTE - NSFOLLOWUPINSTRUCTIONS_ED_ALL_ED_FT
You were seen in the emergency department for right lower quadrant abdominal pain. Your workup in the emergency department includes bloodwork, CT scan of abdomen and pelvis and ultrasound. You can find the results of all the tests in this discharge packet. The presumed diagnosis is hemorrhagic cyst.   Please follow up with your primary care doctor within 48 hours for continuation of care. Please follow up with your obgyn doctor within a week for further management.   Return to the emergency department if you experience any new/concerning/worsening symptoms such as but not limited to: fever (>100.3F), intractable nausea, vomiting, chest pain, shortness of breath, abdominal pain, abnormal vaginal bleeding or discharge.

## 2023-11-02 NOTE — ED PROVIDER NOTE - CLINICAL SUMMARY MEDICAL DECISION MAKING FREE TEXT BOX
Attending Reina:  32 y/o f hx of d&C 1 month ago, presenting to the ed w/ cc of rlq pain, started 11/1 noticed in am, had some upper abd discomfort now in rlq, no hx of k stones, no hematuria, no dysuria/frequency, went to [facility] md told to come to ed, no n/v, no f/c, pain is 5/10 in rlq currently, no diaphoresis, no vaginal dc/bleeding, some mild constipation, afebrile vitals stable  non toxic well appearing, NC/AT,  conjunctiva non conjected, sclera anicteric, moist mucous membranes, neck supple, heart sounds, normal, no mrg, lungs cta b/l no wrr, abd soft non distended w/ mild rlq tenderness, no visual deformities of extremities, axox3,  normal mood and affect, plan for cbc, cmp, ua, CT scan r/o appendicitis, re evaluate.
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
## 2023-11-02 NOTE — ED ADULT NURSE NOTE - OBJECTIVE STATEMENT
Break coverage RN. PT is a 32yo f coming from home c/o RLQ abd pain x1 day. Pt states that she had epigastric pain starting yesterday and then went away, pt states that pain began again in RLQ this afternoon. Pt states that she had miscarriage approx 1 month ago and had D&C after, endorses pelvic pressure when urinating. No pertinent PMH. PT A,Ox4, ambulatory at baseline. Respirations even and unlabored, abd soft, nondistended and tender upon palp, skin warm, dry and intact, SHOEMAKER. Denies HA, CP, SOB, n/v/d, fever, chills. Stretcher locked in lowest position, appropriate side rails up for safety, pt instructed to call for RN if anything needed.

## 2023-11-03 LAB
CULTURE RESULTS: SIGNIFICANT CHANGE UP
CULTURE RESULTS: SIGNIFICANT CHANGE UP
SPECIMEN SOURCE: SIGNIFICANT CHANGE UP
SPECIMEN SOURCE: SIGNIFICANT CHANGE UP

## 2023-11-16 NOTE — ED PROVIDER NOTE - CONDITION AT DISCHARGE:
endplate L3 measuring 0.9 cm (image 39 of series 3) indeterminate. Mild chronic compression deformity of T11 vertebral body. Mild thoracolumbar dextroscoliosis, partially imaged. ABDOMINAL WALL: Diffuse anasarca. No intra-abdominal hernia. Small fluid containing right obturator hernia. ADDITIONAL COMMENTS: N/A     Status post biliary stent and duodenal stent placements with patent stents. 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 Clinical correlation advised. Multicystic gallbladder wall mass lesion which in the setting of known intra-abdominal malignancy is suspicious for metastatic versus primary tumor; adenomyomatosis is a differential diagnostic consideration. Clinical correlation and attention on follow-up advised. Large ascites with mild diffuse peritoneal enhancement which may suggest peritonitis versus carcinomatosis. However, no obvious nodular peritoneal lesion. Single sclerotic focus along the upper endplate L3 vertebral body is indeterminate. Clinical correlation with prior study if available advised to exclude metastasis. Attention on follow-up recommended. Greater than 31 minutes were spent on discharge services.     Signed:  Dominguez Castanon MD  11/16/2023  11:18 AM Improved

## 2024-02-20 NOTE — ED PROVIDER NOTE - PROGRESS NOTE ADDITIONAL1
Second attempted to contact patient's parents with [place] [id number]. Left message.    Additional Progress Note...

## 2024-12-31 ENCOUNTER — OUTPATIENT (OUTPATIENT)
Dept: OUTPATIENT SERVICES | Facility: HOSPITAL | Age: 34
LOS: 1 days | End: 2024-12-31
Payer: COMMERCIAL

## 2024-12-31 VITALS — DIASTOLIC BLOOD PRESSURE: 71 MMHG | SYSTOLIC BLOOD PRESSURE: 109 MMHG | HEART RATE: 105 BPM

## 2024-12-31 VITALS — TEMPERATURE: 99 F

## 2024-12-31 VITALS — OXYGEN SATURATION: 96 %

## 2024-12-31 DIAGNOSIS — Z98.890 OTHER SPECIFIED POSTPROCEDURAL STATES: Chronic | ICD-10-CM

## 2024-12-31 DIAGNOSIS — K08.409 PARTIAL LOSS OF TEETH, UNSPECIFIED CAUSE, UNSPECIFIED CLASS: Chronic | ICD-10-CM

## 2024-12-31 DIAGNOSIS — O26.899 OTHER SPECIFIED PREGNANCY RELATED CONDITIONS, UNSPECIFIED TRIMESTER: ICD-10-CM

## 2024-12-31 LAB
APPEARANCE UR: CLEAR — SIGNIFICANT CHANGE UP
BACTERIA # UR AUTO: ABNORMAL /HPF
BILIRUB UR-MCNC: NEGATIVE — SIGNIFICANT CHANGE UP
CAST: 2 /LPF — SIGNIFICANT CHANGE UP (ref 0–4)
COLOR SPEC: YELLOW — SIGNIFICANT CHANGE UP
DIFF PNL FLD: NEGATIVE — SIGNIFICANT CHANGE UP
GLUCOSE UR QL: NEGATIVE MG/DL — SIGNIFICANT CHANGE UP
KETONES UR-MCNC: NEGATIVE MG/DL — SIGNIFICANT CHANGE UP
LEUKOCYTE ESTERASE UR-ACNC: ABNORMAL
NITRITE UR-MCNC: NEGATIVE — SIGNIFICANT CHANGE UP
PH UR: 7.5 — SIGNIFICANT CHANGE UP (ref 5–8)
PROT UR-MCNC: NEGATIVE MG/DL — SIGNIFICANT CHANGE UP
RBC CASTS # UR COMP ASSIST: 2 /HPF — SIGNIFICANT CHANGE UP (ref 0–4)
REVIEW: SIGNIFICANT CHANGE UP
SP GR SPEC: <1.005 — LOW (ref 1–1.03)
SQUAMOUS # UR AUTO: 9 /HPF — HIGH (ref 0–5)
UROBILINOGEN FLD QL: 0.2 MG/DL — SIGNIFICANT CHANGE UP (ref 0.2–1)
WBC UR QL: 16 /HPF — HIGH (ref 0–5)

## 2024-12-31 PROCEDURE — 96360 HYDRATION IV INFUSION INIT: CPT

## 2024-12-31 PROCEDURE — G0463: CPT

## 2024-12-31 PROCEDURE — 81001 URINALYSIS AUTO W/SCOPE: CPT

## 2024-12-31 PROCEDURE — 93005 ELECTROCARDIOGRAM TRACING: CPT

## 2024-12-31 PROCEDURE — 59025 FETAL NON-STRESS TEST: CPT

## 2024-12-31 PROCEDURE — 93010 ELECTROCARDIOGRAM REPORT: CPT

## 2024-12-31 RX ORDER — SODIUM CHLORIDE 9 MG/ML
1000 INJECTION, SOLUTION INTRAVENOUS ONCE
Refills: 0 | Status: DISCONTINUED | OUTPATIENT
Start: 2024-12-31 | End: 2025-01-15

## 2024-12-31 NOTE — OB PROVIDER TRIAGE NOTE - NSICDXPASTSURGICALHX_GEN_ALL_CORE_FT
PAST SURGICAL HISTORY:  One previous induced termination of pregnancy     [place] teeth extracted

## 2024-12-31 NOTE — OB RN TRIAGE NOTE - SUICIDE SCREENING QUESTION 3
Problem: Dysphagia (Adult)  Goal: *Acute Goals and Plan of Care (Insert Text)  Description: Speech Therapy Goals  Initiated 9/3/2021  -Patient will tolerate regular diet with thin liquids without signs/symptoms of aspiration given no cues within 7 day(s). [ ] Not met  [ ]  MET   [ ] Progressing  [ ] Discontinue  Outcome: Not Met   SPEECH [address] EVALUATION  Patient: Mandi Quintana (66 y.o. male)  Date: 9/3/2021  Primary Diagnosis: CVA (cerebral vascular accident) [facility]) [I63.9]        Precautions: aspiration       ASSESSMENT :  Based on the objective data described below, the patient presents with oropharyngeal sw function WNL. Pt with occasional mild delay with word finding, but pt reports this is baseline. Pt admitted for stroke workup due to LUE and LLE weakness reported. Pt reports mild speech disturbance that has since resolved since onset of symptoms. Pt is retired but works part time as a . Pt's language appears intact with conversation. Vocal quality WNL. Oral phase with limited dentition present, WNL for all trials of thin via cup/straw, puree and solids. Pharyngeal phase WNL. Pt tolerates all without overt s/s aspiration. Patient will benefit from skilled intervention to address the above impairments. Patients rehabilitation potential is considered to be Good     PLAN :  Recommendations and Planned Interventions:  Recommend cont current regular/thin diet with aspiration and GERD precautions. Pt was encouraged to discuss with MD re: return to work and operating heavy equipment. Pt's speech production is mildly impaired but pt reports this is baseline. Will plan to follow-up x 1 if pt remains in acute. Pt aware to monitor function and seek reassessment if changes occur. Frequency/Duration: Patient will be followed by speech-language pathology 1 time a week to address goals.   Discharge Recommendations: no overt needs for SLP identified at this time     SUBJECTIVE:   Patient alert, agreeable, pleasant. OBJECTIVE:     CXR Results  (Last 48 hours)                 09/02/21 1410  XR CHEST SNGL V Final result    Impression:  No acute findings. Narrative:  Shortness of breath. Comparison chest x-ray 11/2/2020. Findings: Single frontal view of the chest. Normal cardiomediastinal silhouette. No vascular congestion or pulmonary edema. The lungs are well-inflated. No   infiltrate, effusion, pneumothorax. No free air under the hemidiaphragms. Bones   grossly intact. CT Results  (Last 48 hours)                 09/02/21 1238  CT CODE NEURO HEAD WO CONTRAST Final result    Impression:  Evidence for degree nonacute end vessel occlusive change. No gross intracranial hemorrhage as above. Study findings called to Dr. Austin Bonilla 12:45 PM 9/20/2021. Narrative:  CT head. Axial images are reviewed along with reformatted sagittal/coronal images. Dose reduction: All CT scans at this facility are performed using dose reduction   optimization techniques as appropriate to a performed exam including the   following-   automated exposure control, adjustments of mA and/or Kv according to patient   size, or use of iterative reconstructive technique. Bone windows demonstrate normal aeration sinuses and mastoid air cells. Review of intracranial content reveals degree periventricular/subcortical white   matter gliosis, evidence to suggest nonacute end vessel occlusive change. Suspect small amount of IV contrast through central vessels of undetermined   origin on this labeled nonenhanced CT head. This may obscure small content   intracranial bleed. No gross intracranial hemorrhage. No hydrocephalus. 09/02/21 1238  CTA CODE NEURO HEAD AND NECK W CONT Final result    Impression:  1. Normal head CT examination. No evidence for aneurysms, focal stenoses or   branch occlusions. Neck CTA           Technique: 3 mm noncontrasted images were obtained through the entire neck. Then   1.5mm axial images with nonionic intravenous contrast were obtained. 2-D and 3-D   reformats of head and neck CT angiographic data performed. Comparisons: None. Findings: A standard arch is present. No significant stenosis of the origin of   the great vessels evident. Right carotid artery: No stenoses of the right common carotid artery evident. There is no stenosis of the origin of the right internal carotid artery by   NASCET criteria. Left carotid artery: No stenoses of the left common carotid artery evident. There is no stenosis of the origin of the left internal carotid artery by NASCET   criteria. The vertebral arteries are codominant without focal stenosis. IMPRESSION:    1. No stenosis of the origin of the right internal carotid artery by NASCET   criteria. 2. No stenosis of the origin of the left internal carotid artery by NASCET   criteria. Narrative: All CT scans at this facility are performed using dose reduction optimization   techniques as appropriate to the performed exam, including the following:   Automated exposure control, adjustment of the MA and/or KVP according to the   patient size, and the use of iterative reconstruction technique. Head CTA           Technique: 3 mm noncontrasted axial images were obtained through the entire   calvarium. Then 1.5 mm axial images with nonionic intravenous contrast obtained. 2-D MIP projections and 3-D images of the intracranial circulation performed. 100 cc Isovue-370 administered. Comparison:  Noncontrasted head CT examination 9/2/2021 and 6/11/2021. Findings: No aneurysms, focal stenoses or branch occlusions are present. The   anterior communicating artery is present. The right posterior communicating   artery is not identified.   The left posterior communicating artery is present. There is no evidence for vertebral basilar insufficiency. Past Medical History:   Diagnosis Date    A-fib ([address]     Bronchitis     Hypertension    No past surgical history on file. Prior Level of Function/Home Situation: independent  Home Situation  Home Environment: Private residence  # Steps to Enter: 4  Rails to Enter: Yes  Hand Rails : Bilateral  One/Two Story Residence: Two story  # of Interior Steps: 15  Interior Rails: None  Living Alone: No  Support Systems: Child(jaclyn), Family member(s)  Patient Expects to be Discharged toF Cor[de-identified]ration  Current DME Used/Available at Home: None  Diet prior to admission: regular/thin  Current Diet:  regular/thin   Cognitive and Communication Status:  Neurologic State: Alert  Orientation Level: Oriented X4  Cognition: Follows commands    Pain:  Pain Scale 1: Numeric (0 - 10)  Pain Intensity 1: 0       After treatment:   Patient left in no apparent distress in bed, Call bell within reach, and Nursing notified    COMMUNICATION/EDUCATION:   Patient was educated regarding purpose of SLP assessment, POC, diet recs and sw safety precautions. Patient demonstrated Excellent understanding as evidenced by verbal responsiveness. The patient's plan of care including recommendations, planned interventions, and recommended diet changes were discussed with: Registered nurse. Patient/family have participated as able in goal setting and plan of care. Patient/family agree to work toward stated goals and plan of care.     Thank you for this referral.  Santiago Langford M.S. CCC-SLP  Time Calculation: 13 mins No

## 2024-12-31 NOTE — OB PROVIDER TRIAGE NOTE - NSHPPHYSICALEXAM_GEN_ALL_CORE
Objective  – Vital Signs  BP: 120s/80s  HR: 120s-130s  Temp: AF    – PE:   CV: RRR  Pulm: breathing comfortably on RA  Abd: gravid, nontender  Extr: moving all extremities with ease    – Spec: no pooling, no bleeding    – VE: 1/0/-3    – FHT: baseline 145bpm, mod variability, +accels, -decels  – [place]: acontractile  – EFW: 2600g by sono  – Sono: vertex Objective  – Vital Signs  BP: 120s/80s  HR: 120s-130s  Temp: AF    – PE:   CV: RRR  Pulm: breathing comfortably on RA  Abd: gravid, nontender  Extr: moving all extremities with ease    – Spec: no pooling, no bleeding    – VE: 1/0/-3    – FHT: baseline 145bpm, mod variability, +accels, -decels  – [place]: acontractile  – EFW: 2600g by sono  – Sono: pending Objective  – Vital Signs  BP: 120s/80s  HR: 120s-130s  Temp: AF    – PE:   CV: RRR  Pulm: breathing comfortably on RA  Abd: gravid, nontender  Extr: moving all extremities with ease    – Spec: no pooling, no bleeding    – VE: 1/0/-3    – FHT: baseline 145bpm, mod variability, +accels, -decels  – [place]: acontractile  – EFW: 2600g by sono  – Sono: BPP 8/8, SONNY 19.7, VTX

## 2024-12-31 NOTE — OB PROVIDER TRIAGE NOTE - HISTORY OF PRESENT ILLNESS
Ob Provider Triage     Subjective  HPI: 34yoF  @ 33w6d presents with vaginal bleeding.  Pt reports vaginal spotting x1 hr. Notes bright red. Denies bleeding heavier than spotting.  Denies recent object in vagina or sexual intercourse. +FM. -LOF. -CTXs.   Notes cough and congestion since yesterday.   Denies fevers, chills, nausea, vomiting, lightheadedness, dizziness, chest pain, SOB.   Denies placental abnormalities.    – PNC: Denies prenatal issues. GBS unk.  EFW 2600g by donna.  – OBHx:   TOP with D&C in   MAB with D&C in   chemical pregnancy 2024  – GynHx: denies  – PMH: denies  – PSH: D&C x2  – Psych: denies   – Social: denies   – Meds: PNV   – Allergies: NKDA  – Will accept blood transfusions? Yes     Ob Provider Triage     Subjective  HPI: 34yoF  @ 33w6d presents with vaginal bleeding.  Pt reports a few episodes of vaginal spotting while wiping x1 hr. Notes bright red. Denies bleeding heavier than spotting.  Denies recent object in vagina or sexual intercourse. +FM. -LOF. -CTXs.   Notes cough and congestion since yesterday.   Denies fevers, chills, nausea, vomiting, lightheadedness, dizziness, chest pain, SOB, dysuria.   Denies placental abnormalities.    – PNC: Denies prenatal issues. GBS unk.  EFW 2600g by donna.  – OBHx:   TOP with D&C in   MAB with D&C in   chemical pregnancy 2024  – GynHx: denies  – PMH: denies  – PSH: D&C x2  – Psych: denies   – Social: denies   – Meds: PNV   – Allergies: NKDA  – Will accept blood transfusions? Yes

## 2024-12-31 NOTE — OB PROVIDER TRIAGE NOTE - NSOBPROVIDERNOTE_OBGYN_ALL_OB_FT
Assessment  34yoF  @ 33w6d presents with vaginal bleeding. No vaginal bleeding on exam. Pt tachy to 120s-130s.     Plan  - NST reactive  - VE 1/0/-3. Pt without ctx on toco and not endorsing ctx  - No vaginal bleeding on speculum exam - reassuring  - Pt tachy to 120s-130s. Pt without cardiac sx. May be secondary to possible viral infection given URI sx however will obtain EKG and administer 1L bolus  - Obtain BPP  - Continue to monitor    Patient discussed with attending physician, Dr. Jorge George MD PGY2 Assessment  34yoF  @ 33w6d presents with vaginal bleeding. No vaginal bleeding on exam. Pt tachy to 120s-130s.     Plan  - NST reactive  - VE 1/0/-3. Pt without ctx on toco and not endorsing ctx  - No vaginal bleeding on speculum exam - reassuring  - F/u UA  - Pt tachy to 120s-130s. Pt without cardiac sx. May be secondary to possible viral infection given URI sx however will obtain EKG and administer 1L bolus  - Obtain BPP  - Continue to monitor    Patient discussed with attending physician, Dr. Jorge George MD PGY2 Assessment  34yoF  @ 33w6d presents with vaginal bleeding. No vaginal bleeding on exam. Pt tachy to 120s-130s.     Plan  - NST reactive  - VE 1/0/-3. Pt without ctx on toco and not endorsing ctx  - No vaginal bleeding on speculum exam - reassuring  - F/u UA  - Pt tachy to 120s-130s. Pt without cardiac sx. May be secondary to possible viral infection given URI sx however will obtain EKG and administer 1L bolus  - BPP 8/8, SONNY 19.7, VTX  - Continue to monitor    Patient discussed with attending physician, Dr. Jorge George MD PGY2 Assessment  34yoF  @ 33w6d presents with vaginal bleeding. No vaginal bleeding on exam. Pt tachy to 120s-130s.     Plan  - NST reactive  - VE 1/0/-3. Pt without ctx on toco and not endorsing ctx  - No vaginal bleeding on speculum exam - reassuring  - F/u UA  - Pt tachy to 120s-130s. Pt without cardiac sx. May be secondary to possible viral infection given URI sx however will obtain EKG and administer 1L bolus  - BPP 8/8, SONNY 19.7, VTX  - Continue to monitor    Patient discussed with attending physician, Dr. Jorge George MD PGY2    ADDENDUM  3:59PM    Pt with no further episodes of vaginal bleeding.  Received 900cc of 1L bolus with improvement of HR from 120-130s to 110s.  EKG wnl.  Return precautions reviewed including further VB, CTX, LOF, decreased FM. Pt expresses understanding.     D/w attending physician, Dr. Juan George PGY2 Assessment  34yoF  @ 33w6d presents with vaginal bleeding. No vaginal bleeding on exam. Pt tachy to 120s-130s.     Plan  - NST reactive  - VE 1/0/-3. Pt without ctx on toco and not endorsing ctx  - No vaginal bleeding on speculum exam - reassuring  - F/u UA  - Pt tachy to 120s-130s. Pt without cardiac sx. May be secondary to possible viral infection given URI sx however will obtain EKG and administer 1L bolus  - BPP 8/8, SONNY 19.7, VTX  - Continue to monitor    Patient discussed with attending physician, Dr. Jorge George MD PGY2    ADDENDUM  3:59PM    Pt with no further episodes of vaginal bleeding.  Received 900cc of 1L bolus with improvement of HR from 120-130s to 110s.  EKG sinus tachy.  Return precautions reviewed including further VB, CTX, LOF, decreased FM. Pt expresses understanding.     D/w attending physician, Dr. Juan George PGY2 Assessment  34yoF  @ 33w6d presents with vaginal bleeding. No vaginal bleeding on exam. Pt tachy to 120s-130s.     Plan  - NST reactive  - VE 1/0/-3. Pt without ctx on toco and not endorsing ctx  - No vaginal bleeding on speculum exam - reassuring  - F/u UA  - Pt tachy to 120s-130s. Pt without cardiac sx. May be secondary to possible viral infection given URI sx however will obtain EKG and administer 1L bolus  - BPP 8/8, SONNY 19.7, VTX  - Continue to monitor    Patient discussed with attending physician, Dr. Jorge George MD PGY2    ADDENDUM  3:59PM    Pt with no further episodes of vaginal bleeding.  Received 900cc of 1L bolus with improvement of HR from 120-130s to 110s.  EKG sinus tachy.  Pt evaluated at bedside to review return precautions however pt now states she has had abd cramping 4/10 on pain scale for past 1 hr. Will place patient on monitor to eval FHT and toco.     D/w attending physician, Dr. Juan George PGY2

## 2024-12-31 NOTE — OB RN TRIAGE NOTE - NSICDXPASTSURGICALHX_GEN_ALL_CORE_FT
PAST SURGICAL HISTORY:  One previous induced termination of pregnancy     [place] teeth extracted

## 2025-01-06 DIAGNOSIS — R09.81 NASAL CONGESTION: ICD-10-CM

## 2025-01-06 DIAGNOSIS — R00.0 TACHYCARDIA, UNSPECIFIED: ICD-10-CM

## 2025-01-06 DIAGNOSIS — Z3A.33 33 WEEKS GESTATION OF PREGNANCY: ICD-10-CM

## 2025-01-06 DIAGNOSIS — O99.343 OTHER MENTAL DISORDERS COMPLICATING PREGNANCY, THIRD TRIMESTER: ICD-10-CM

## 2025-01-06 DIAGNOSIS — O26.893 OTHER SPECIFIED PREGNANCY RELATED CONDITIONS, THIRD TRIMESTER: ICD-10-CM

## 2025-01-06 DIAGNOSIS — R05.9 COUGH, UNSPECIFIED: ICD-10-CM

## 2025-01-06 DIAGNOSIS — O46.93 ANTEPARTUM HEMORRHAGE, UNSPECIFIED, THIRD TRIMESTER: ICD-10-CM

## 2025-01-06 DIAGNOSIS — F90.9 ATTENTION-DEFICIT HYPERACTIVITY DISORDER, UNSPECIFIED TYPE: ICD-10-CM

## 2025-02-11 ENCOUNTER — OUTPATIENT (OUTPATIENT)
Dept: OUTPATIENT SERVICES | Facility: HOSPITAL | Age: 35
LOS: 1 days | End: 2025-02-11
Payer: COMMERCIAL

## 2025-02-11 VITALS
WEIGHT: 223.99 LBS | HEART RATE: 124 BPM | TEMPERATURE: 98 F | SYSTOLIC BLOOD PRESSURE: 125 MMHG | RESPIRATION RATE: 18 BRPM | HEIGHT: 64 IN | DIASTOLIC BLOOD PRESSURE: 87 MMHG | OXYGEN SATURATION: 97 %

## 2025-02-11 DIAGNOSIS — Z01.818 ENCOUNTER FOR OTHER PREPROCEDURAL EXAMINATION: ICD-10-CM

## 2025-02-11 DIAGNOSIS — O36.63X0 MATERNAL CARE FOR EXCESSIVE FETAL GROWTH, THIRD TRIMESTER, NOT APPLICABLE OR UNSPECIFIED: ICD-10-CM

## 2025-02-11 DIAGNOSIS — Z98.890 OTHER SPECIFIED POSTPROCEDURAL STATES: Chronic | ICD-10-CM

## 2025-02-11 DIAGNOSIS — O36.60X0 MATERNAL CARE FOR EXCESSIVE FETAL GROWTH, UNSPECIFIED TRIMESTER, NOT APPLICABLE OR UNSPECIFIED: ICD-10-CM

## 2025-02-11 DIAGNOSIS — K08.409 PARTIAL LOSS OF TEETH, UNSPECIFIED CAUSE, UNSPECIFIED CLASS: Chronic | ICD-10-CM

## 2025-02-11 LAB
ANION GAP SERPL CALC-SCNC: 13 MMOL/L — SIGNIFICANT CHANGE UP (ref 5–17)
BLD GP AB SCN SERPL QL: NEGATIVE — SIGNIFICANT CHANGE UP
BUN SERPL-MCNC: 6 MG/DL — LOW (ref 7–23)
CALCIUM SERPL-MCNC: 9.9 MG/DL — SIGNIFICANT CHANGE UP (ref 8.4–10.5)
CHLORIDE SERPL-SCNC: 103 MMOL/L — SIGNIFICANT CHANGE UP (ref 96–108)
CO2 SERPL-SCNC: 19 MMOL/L — LOW (ref 22–31)
CREAT SERPL-MCNC: 0.63 MG/DL — SIGNIFICANT CHANGE UP (ref 0.5–1.3)
EGFR: 121 ML/MIN/1.73M2 — SIGNIFICANT CHANGE UP
GLUCOSE SERPL-MCNC: 82 MG/DL — SIGNIFICANT CHANGE UP (ref 70–99)
HCT VFR BLD CALC: 30.2 % — LOW (ref 34.5–45)
HGB BLD-MCNC: 9.3 G/DL — LOW (ref 11.5–15.5)
MCHC RBC-ENTMCNC: 25.4 PG — LOW (ref 27–34)
MCHC RBC-ENTMCNC: 30.8 G/DL — LOW (ref 32–36)
MCV RBC AUTO: 82.5 FL — SIGNIFICANT CHANGE UP (ref 80–100)
NRBC BLD AUTO-RTO: 0 /100 WBCS — SIGNIFICANT CHANGE UP (ref 0–0)
PLATELET # BLD AUTO: 413 K/UL — HIGH (ref 150–400)
POTASSIUM SERPL-MCNC: 3.9 MMOL/L — SIGNIFICANT CHANGE UP (ref 3.5–5.3)
POTASSIUM SERPL-SCNC: 3.9 MMOL/L — SIGNIFICANT CHANGE UP (ref 3.5–5.3)
RBC # BLD: 3.66 M/UL — LOW (ref 3.8–5.2)
RBC # FLD: 14.5 % — SIGNIFICANT CHANGE UP (ref 10.3–14.5)
RH IG SCN BLD-IMP: POSITIVE — SIGNIFICANT CHANGE UP
SODIUM SERPL-SCNC: 135 MMOL/L — SIGNIFICANT CHANGE UP (ref 135–145)
WBC # BLD: 11.72 K/UL — HIGH (ref 3.8–10.5)
WBC # FLD AUTO: 11.72 K/UL — HIGH (ref 3.8–10.5)

## 2025-02-11 PROCEDURE — 86850 RBC ANTIBODY SCREEN: CPT

## 2025-02-11 PROCEDURE — 80048 BASIC METABOLIC PNL TOTAL CA: CPT

## 2025-02-11 PROCEDURE — G0463: CPT

## 2025-02-11 PROCEDURE — 86901 BLOOD TYPING SEROLOGIC RH(D): CPT

## 2025-02-11 PROCEDURE — 85027 COMPLETE CBC AUTOMATED: CPT

## 2025-02-11 PROCEDURE — 86900 BLOOD TYPING SEROLOGIC ABO: CPT

## 2025-02-11 RX ORDER — SODIUM CHLORIDE 9 G/ML
1000 INJECTION, SOLUTION INTRAVENOUS
Refills: 0 | Status: DISCONTINUED | OUTPATIENT
Start: 2025-02-12 | End: 2025-02-12

## 2025-02-11 RX ORDER — SODIUM CHLORIDE 9 G/ML
1000 INJECTION, SOLUTION INTRAVENOUS
Refills: 0 | Status: DISCONTINUED | OUTPATIENT
Start: 2025-02-12 | End: 2025-02-14

## 2025-02-11 RX ORDER — ANTISEPTIC SURGICAL SCRUB 0.04 MG/ML
1 SOLUTION TOPICAL DAILY
Refills: 0 | Status: DISCONTINUED | OUTPATIENT
Start: 2025-02-12 | End: 2025-02-12

## 2025-02-11 NOTE — OB PST NOTE - HISTORY OF PRESENT ILLNESS
This is a 34    Currently 39 weeks and 6 days gesation    Presenting to PST for scheduled for Primary  on 25 with Dr. Martinez This is a 34 year old female with no significant past medical history, not taking any medications. Currently 39 weeks and 6 days gestation. Today presenting accompanied with  to PST for scheduled for Primary  for LGA on 25 with Dr. Martinez. Of note, pt was seen on , L&D triage- c/o vaginal spotting, noted to have tachycardiac - 130's, EKG done at that time and discharged home. During PST visit HR, 116- 120's, pt is asymptomatic, denies any lightheadedness dizziness, CP, palpitations or SOB. Noted to have trace amount of vaginal perspiration on examination paper, pt denies any vaginal bleeding or leakage of fluid. Pt advised should she experience signs of labor abdominal cramps, contractions, water breakage, vaginal bleeding etc, call OB or 911.

## 2025-02-11 NOTE — OB PST NOTE - NSHPREVIEWOFSYSTEMS_GEN_ALL_CORE
Denies any lightheadedness, dizziness, CP or SOB  Denies n/v or abdominal; pain  Denies any vaginal bleeding or spotting

## 2025-02-11 NOTE — OB PST NOTE - NS_OBGYNHISTORY_OBGYN_ALL_OB_FT
Prior D/C  + Ovarian cyst, denies any polyps or cyst Prior missed abortions   + Ovarian cyst, denies any polyps or cyst

## 2025-02-11 NOTE — OB PST NOTE - ASSESSMENT
CAPRINI SCORE    AGE RELATED RISK FACTORS                                                             [ ] Age 41-60 years                                            (1 Point)  [ ] Age: 61-74 years                                           (2 Points)                 [ ] Age= 75 years                                                (3 Points)             DISEASE RELATED RISK FACTORS                                                       [ ] Edema in the lower extremities                 (1 Point)                     [ ] Varicose veins                                               (1 Point)                                 [ ] BMI > 25 Kg/m2                                            (1 Point)                                  [ ] Serious infection (ie PNA)                            (1 Point)                     [ ] Lung disease ( COPD, Emphysema)            (1 Point)                                                                          [ ] Acute myocardial infarction                         (1 Point)                  [ ] Congestive heart failure (in the previous month)  (1 Point)         [ ] Inflammatory bowel disease                            (1 Point)                  [ ] Central venous access, PICC or Port               (2 points)       (within the last month)                                                                [ ] Stroke (in the previous month)                        (5 Points)    [ ] Previous or present malignancy                       (2 points)                                                                                                                                                         HEMATOLOGY RELATED FACTORS                                                         [ ] Prior episodes of VTE                                     (3 Points)                     [ ] Positive family history for VTE                      (3 Points)                  [ ] Prothrombin 60943 A                                     (3 Points)                     [ ] Factor V Leiden                                                (3 Points)                        [ ] Lupus anticoagulants                                      (3 Points)                                                           [ ] Anticardiolipin antibodies                              (3 Points)                                                       [ ] High homocysteine in the blood                   (3 Points)                                             [ ] Other congenital or acquired thrombophilia      (3 Points)                                                [ ] Heparin induced thrombocytopenia                  (3 Points)                                        MOBILITY RELATED FACTORS  [ ] Bed rest                                                         (1 Point)  [ ] Plaster cast                                                    (2 points)  [ ] Bed bound for more than 72 hours           (2 Points)    GENDER SPECIFIC FACTORS  [ ] Pregnancy or had a baby within the last month   (1 Point)  [ ] Post-partum < 6 weeks                                   (1 Point)  [ ] Hormonal therapy  or oral contraception   (1 Point)  [ ] History of pregnancy complications              (1 point)  [ ] Unexplained or recurrent              (1 Point)    OTHER RISK FACTORS                                           (1 Point)  [ ] BMI >40, smoking, diabetes requiring insulin, chemotherapy  blood transfusions and length of surgery over 2 hours    SURGERY RELATED RISK FACTORS  [ ]  Section within the last month     (1 Point)  [ ] Minor surgery                                                  (1 Point)  [ ] Arthroscopic surgery                                       (2 Points)  [ ] Planned major surgery lasting more            (2 Points)      than 45 minutes     [ ] Elective hip or knee joint replacement       (5 points)       surgery                                                TRAUMA RELATED RISK FACTORS  [ ] Fracture of the hip, pelvis, or leg                       (5 Points)  [ ] Spinal cord injury resulting in paralysis             (5 points)       (in the previous month)    [ ] Paralysis  (less than 1 month)                             (5 Points)  [ ] Multiple Trauma within 1 month                        (5 Points)    Total Score [        ]    Caprini Score 0-2: Low Risk, NO VTE prophylaxis required for most patients, encourage ambulation  Caprini Score 3-6: Moderate Risk , pharmacologic VTE prophylaxis is indicated for most patients (in the absence of contraindications)  Caprini Score Greater than or =7: High risk, pharmocologic VTE prophylaxis indicated for most patients (in the absence of contraindications)                               CAPRINI SCORE    AGE RELATED RISK FACTORS                                                             [ ] Age 41-60 years                                            (1 Point)  [ ] Age: 61-74 years                                           (2 Points)                 [ ] Age= 75 years                                                (3 Points)             DISEASE RELATED RISK FACTORS                                                       [ ] Edema in the lower extremities                 (1 Point)                     [ ] Varicose veins                                               (1 Point)                                 [x ] BMI > 25 Kg/m2                                            (1 Point)                                  [ ] Serious infection (ie PNA)                            (1 Point)                     [ ] Lung disease ( COPD, Emphysema)            (1 Point)                                                                          [ ] Acute myocardial infarction                         (1 Point)                  [ ] Congestive heart failure (in the previous month)  (1 Point)         [ ] Inflammatory bowel disease                            (1 Point)                  [ ] Central venous access, PICC or Port               (2 points)       (within the last month)                                                                [ ] Stroke (in the previous month)                        (5 Points)    [ ] Previous or present malignancy                       (2 points)                                                                                                                                                         HEMATOLOGY RELATED FACTORS                                                         [ ] Prior episodes of VTE                                     (3 Points)                     [ ] Positive family history for VTE                      (3 Points)                  [ ] Prothrombin 45586 A                                     (3 Points)                     [ ] Factor V Leiden                                                (3 Points)                        [ ] Lupus anticoagulants                                      (3 Points)                                                           [ ] Anticardiolipin antibodies                              (3 Points)                                                       [ ] High homocysteine in the blood                   (3 Points)                                             [ ] Other congenital or acquired thrombophilia      (3 Points)                                                [ ] Heparin induced thrombocytopenia                  (3 Points)                                        MOBILITY RELATED FACTORS  [ ] Bed rest                                                         (1 Point)  [ ] Plaster cast                                                    (2 points)  [ ] Bed bound for more than 72 hours           (2 Points)    GENDER SPECIFIC FACTORS  [x ] Pregnancy or had a baby within the last month   (1 Point)  [ ] Post-partum < 6 weeks                                   (1 Point)  [ ] Hormonal therapy  or oral contraception   (1 Point)  [ ] History of pregnancy complications              (1 point)  [ ] Unexplained or recurrent              (1 Point)    OTHER RISK FACTORS                                           (1 Point)  [ ] BMI >40, smoking, diabetes requiring insulin, chemotherapy  blood transfusions and length of surgery over 2 hours    SURGERY RELATED RISK FACTORS  [ ]  Section within the last month     (1 Point)  [ ] Minor surgery                                                  (1 Point)  [ ] Arthroscopic surgery                                       (2 Points)  [x ] Planned major surgery lasting more            (2 Points)      than 45 minutes     [ ] Elective hip or knee joint replacement       (5 points)       surgery                                                TRAUMA RELATED RISK FACTORS  [ ] Fracture of the hip, pelvis, or leg                       (5 Points)  [ ] Spinal cord injury resulting in paralysis             (5 points)       (in the previous month)    [ ] Paralysis  (less than 1 month)                             (5 Points)  [ ] Multiple Trauma within 1 month                        (5 Points)    Total Score [   4]    Caprini Score 0-2: Low Risk, NO VTE prophylaxis required for most patients, encourage ambulation  Caprini Score 3-6: Moderate Risk , pharmacologic VTE prophylaxis is indicated for most patients (in the absence of contraindications)  Caprini Score Greater than or =7: High risk, pharmocologic VTE prophylaxis indicated for most patients (in the absence of contraindications)

## 2025-02-11 NOTE — OB PST NOTE - PATIENT ON (OXYGEN DELIVERY METHOD)
[id number] [facility]    ACUTE REHABILITATION OCCUPATIONAL THERAPY  DAILY NOTE    Date: 19  Patient Name: Verna Kang      Room: 6570/5189-62    MRN: [id number]   : 1942  (78 y.o.)  Gender: male   Referring Practitioner: Mary Ellen Melchor  Diagnosis: Left cerebellar ICH. Pt is s/p suboccipital craniotomy for cerebellar ICH on 19. L side ataxia, double vision -eye patch change every 2 hours. Additional Pertinent Hx: pt. reports being tired from brain bleed    Restrictions  Restrictions/Precautions: Cardiac, Fall Risk  Implants present? : Metal implants(L TKA)  Other position/activity restrictions: Alternate eye patch q2h for diplopia from [facility]. Subjective  Subjective: \"oh I want to do it myself\" pt states in regards to self propell seated in w/c from room>OT gym  Comments: Pt pleasant and cooperative. Patient Currently in Pain: Denies  Restrictions/Precautions: Cardiac; Fall Risk  Overall Orientation Status: Within Normal Limits     Pain Assessment  Pain Assessment: 0-10  Pain Level: 0  Pain Type: Acute pain  Pain Location: Shoulder  Pain Orientation: Left  Pain Descriptors: Aching    Objective     Balance  Sitting Balance: Supervision        Functional Mobility  Functional - Mobility Device: Wheelchair  Activity: To/From therapy gym; Other  Assist Level: Stand by assistance  Functional Mobility Comments: self propell from room>OT gym and in OT gym, cuing for visual scanning  Fine Motor: stringing beads following pattern to address B hand coordination for improved task performance, extended time req for bead placement using L hand, no A req for correct shape or color  Exercises  Other: graded resistive clothespins using LUE to increase hand strength/coordination and accuracy for functional tasks, extended time req for pin removal/placement d/t ataxic movements c LUE                                  OT FIM: see flowsheet for FIM scores this date. room air

## 2025-02-11 NOTE — OB PST NOTE - NSHPPHYSICALEXAM_GEN_ALL_CORE
Constitutional: well developed, well nourished,  and no acute distress    Neurological: Alert & Oriented x 3,  no focal deficits    HEENT:   Neck supple.    Respiratory: CTA B/L, No wheezing/crackles/rhonchi    Cardiovascular: (+) S1 & S2, RRR    Gastrointestinal: soft, NT, nondistended, (+) BS    Genitourinary: non distended bladder, voiding freely    Extremities: No pedal edema, No clubbing, No cyanosis    Skin:  normal skin color and pigmentation, no skin lesions Neurological: Alert & Oriented x 3  Respiratory: CTA B/L, No wheezing/crackles/rhonchi  Cardiovascular: (+) S1 & S2, RRR  Gastrointestinal: Gravid abdomen  Genitourinary:  voiding freely  Extremities: No pedal edema  Skin intact-

## 2025-02-12 ENCOUNTER — INPATIENT (INPATIENT)
Facility: HOSPITAL | Age: 35
LOS: 1 days | Discharge: HOME | DRG: 833 | End: 2025-02-14
Attending: OBSTETRICS & GYNECOLOGY | Admitting: OBSTETRICS & GYNECOLOGY
Payer: COMMERCIAL

## 2025-02-12 DIAGNOSIS — O36.63X0 MATERNAL CARE FOR EXCESSIVE FETAL GROWTH, THIRD TRIMESTER, NOT APPLICABLE OR UNSPECIFIED: ICD-10-CM

## 2025-02-12 DIAGNOSIS — Z98.890 OTHER SPECIFIED POSTPROCEDURAL STATES: Chronic | ICD-10-CM

## 2025-02-12 DIAGNOSIS — K08.409 PARTIAL LOSS OF TEETH, UNSPECIFIED CAUSE, UNSPECIFIED CLASS: Chronic | ICD-10-CM

## 2025-02-12 LAB
BASOPHILS # BLD AUTO: 0.03 K/UL — SIGNIFICANT CHANGE UP (ref 0–0.2)
BASOPHILS NFR BLD AUTO: 0.2 % — SIGNIFICANT CHANGE UP (ref 0–2)
BLD GP AB SCN SERPL QL: NEGATIVE — SIGNIFICANT CHANGE UP
EOSINOPHIL # BLD AUTO: 0.05 K/UL — SIGNIFICANT CHANGE UP (ref 0–0.5)
EOSINOPHIL NFR BLD AUTO: 0.4 % — SIGNIFICANT CHANGE UP (ref 0–6)
HCT VFR BLD CALC: 29.9 % — LOW (ref 34.5–45)
HGB BLD-MCNC: 9.3 G/DL — LOW (ref 11.5–15.5)
IMM GRANULOCYTES NFR BLD AUTO: 0.3 % — SIGNIFICANT CHANGE UP (ref 0–0.9)
LYMPHOCYTES # BLD AUTO: 19.7 % — SIGNIFICANT CHANGE UP (ref 13–44)
LYMPHOCYTES # BLD AUTO: 2.39 K/UL — SIGNIFICANT CHANGE UP (ref 1–3.3)
MCHC RBC-ENTMCNC: 26 PG — LOW (ref 27–34)
MCHC RBC-ENTMCNC: 31.1 G/DL — LOW (ref 32–36)
MCV RBC AUTO: 83.5 FL — SIGNIFICANT CHANGE UP (ref 80–100)
MONOCYTES # BLD AUTO: 0.9 K/UL — SIGNIFICANT CHANGE UP (ref 0–0.9)
MONOCYTES NFR BLD AUTO: 7.4 % — SIGNIFICANT CHANGE UP (ref 2–14)
NEUTROPHILS # BLD AUTO: 8.7 K/UL — HIGH (ref 1.8–7.4)
NEUTROPHILS NFR BLD AUTO: 72 % — SIGNIFICANT CHANGE UP (ref 43–77)
NRBC BLD AUTO-RTO: 0 /100 WBCS — SIGNIFICANT CHANGE UP (ref 0–0)
PLATELET # BLD AUTO: 424 K/UL — HIGH (ref 150–400)
RBC # BLD: 3.58 M/UL — LOW (ref 3.8–5.2)
RBC # FLD: 14.4 % — SIGNIFICANT CHANGE UP (ref 10.3–14.5)
RH IG SCN BLD-IMP: POSITIVE — SIGNIFICANT CHANGE UP
T PALLIDUM AB TITR SER: NEGATIVE — SIGNIFICANT CHANGE UP
WBC # BLD: 12.11 K/UL — HIGH (ref 3.8–10.5)
WBC # FLD AUTO: 12.11 K/UL — HIGH (ref 3.8–10.5)

## 2025-02-12 PROCEDURE — 59514 CESAREAN DELIVERY ONLY: CPT | Mod: AS

## 2025-02-12 RX ORDER — OXYCODONE HYDROCHLORIDE 30 MG/1
10 TABLET ORAL
Refills: 0 | Status: DISCONTINUED | OUTPATIENT
Start: 2025-02-12 | End: 2025-02-13

## 2025-02-12 RX ORDER — OXYTOCIN/0.9 % SODIUM CHLORIDE 10/500ML
42 PLASTIC BAG, INJECTION (ML) INTRAVENOUS
Qty: 30 | Refills: 0 | Status: DISCONTINUED | OUTPATIENT
Start: 2025-02-12 | End: 2025-02-14

## 2025-02-12 RX ORDER — MAGNESIUM HYDROXIDE 400 MG/5ML
30 SUSPENSION, ORAL (FINAL DOSE FORM) ORAL
Refills: 0 | Status: DISCONTINUED | OUTPATIENT
Start: 2025-02-12 | End: 2025-02-14

## 2025-02-12 RX ORDER — ACETAMINOPHEN 160 MG/5ML
1000 SUSPENSION ORAL ONCE
Refills: 0 | Status: COMPLETED | OUTPATIENT
Start: 2025-02-12 | End: 2025-02-12

## 2025-02-12 RX ORDER — OXYCODONE HYDROCHLORIDE 30 MG/1
5 TABLET ORAL
Refills: 0 | Status: DISCONTINUED | OUTPATIENT
Start: 2025-02-12 | End: 2025-02-13

## 2025-02-12 RX ORDER — SODIUM CITRATE AND CITRIC ACID MONOHYDRATE 1002; 1500 MG/15ML; MG/15ML
15 SOLUTION ORAL ONCE
Refills: 0 | Status: COMPLETED | OUTPATIENT
Start: 2025-02-12 | End: 2025-02-12

## 2025-02-12 RX ORDER — FAMOTIDINE 10 MG/ML
20 INJECTION INTRAVENOUS ONCE
Refills: 0 | Status: COMPLETED | OUTPATIENT
Start: 2025-02-12 | End: 2025-02-12

## 2025-02-12 RX ORDER — IBUPROFEN 600 MG/1
600 TABLET, FILM COATED ORAL EVERY 6 HOURS
Refills: 0 | Status: COMPLETED | OUTPATIENT
Start: 2025-02-12 | End: 2026-01-11

## 2025-02-12 RX ORDER — SODIUM CHLORIDE 9 G/ML
1000 INJECTION, SOLUTION INTRAVENOUS ONCE
Refills: 0 | Status: COMPLETED | OUTPATIENT
Start: 2025-02-12 | End: 2025-02-12

## 2025-02-12 RX ORDER — HEPARIN SODIUM,PORCINE 10000/ML
5000 VIAL (ML) INJECTION
Refills: 0 | Status: DISCONTINUED | OUTPATIENT
Start: 2025-02-12 | End: 2025-02-14

## 2025-02-12 RX ORDER — ACETAMINOPHEN 160 MG/5ML
975 SUSPENSION ORAL
Refills: 0 | Status: DISCONTINUED | OUTPATIENT
Start: 2025-02-12 | End: 2025-02-14

## 2025-02-12 RX ORDER — KETOROLAC TROMETHAMINE 10 MG
30 TABLET ORAL EVERY 6 HOURS
Refills: 0 | Status: DISCONTINUED | OUTPATIENT
Start: 2025-02-12 | End: 2025-02-13

## 2025-02-12 RX ORDER — OXYCODONE HYDROCHLORIDE 30 MG/1
5 TABLET ORAL
Refills: 0 | Status: COMPLETED | OUTPATIENT
Start: 2025-02-12 | End: 2025-02-19

## 2025-02-12 RX ORDER — MORPHINE SULFATE 60 MG/1
0.1 TABLET, FILM COATED, EXTENDED RELEASE ORAL ONCE
Refills: 0 | Status: DISCONTINUED | OUTPATIENT
Start: 2025-02-12 | End: 2025-02-13

## 2025-02-12 RX ORDER — ONDANSETRON 4 MG/1
4 TABLET, ORALLY DISINTEGRATING ORAL EVERY 6 HOURS
Refills: 0 | Status: DISCONTINUED | OUTPATIENT
Start: 2025-02-12 | End: 2025-02-13

## 2025-02-12 RX ORDER — CEFAZOLIN SODIUM IN 0.9 % NACL 2 G/10 ML
2000 SYRINGE (ML) INTRAVENOUS ONCE
Refills: 0 | Status: COMPLETED | OUTPATIENT
Start: 2025-02-12 | End: 2025-02-12

## 2025-02-12 RX ORDER — DEXAMETHASONE SODIUM PHOSPHATE 4 MG/ML
4 INJECTION, SOLUTION INTRA-ARTICULAR; INTRALESIONAL; INTRAMUSCULAR; INTRAVENOUS; SOFT TISSUE EVERY 6 HOURS
Refills: 0 | Status: DISCONTINUED | OUTPATIENT
Start: 2025-02-12 | End: 2025-02-13

## 2025-02-12 RX ORDER — METHYLERGONOVINE MALEATE 0.2 MG/1
0.2 TABLET, COATED ORAL ONCE
Refills: 0 | Status: COMPLETED | OUTPATIENT
Start: 2025-02-12 | End: 2025-02-12

## 2025-02-12 RX ORDER — SODIUM CHLORIDE 9 G/ML
1000 INJECTION, SOLUTION INTRAVENOUS
Refills: 0 | Status: DISCONTINUED | OUTPATIENT
Start: 2025-02-12 | End: 2025-02-14

## 2025-02-12 RX ORDER — DIPHENHYDRAMINE HCL 25 MG
25 CAPSULE ORAL EVERY 6 HOURS
Refills: 0 | Status: COMPLETED | OUTPATIENT
Start: 2025-02-12 | End: 2026-01-11

## 2025-02-12 RX ORDER — METHYLERGONOVINE MALEATE 0.2 MG/1
0.2 TABLET, COATED ORAL EVERY 4 HOURS
Refills: 0 | Status: COMPLETED | OUTPATIENT
Start: 2025-02-12 | End: 2025-02-13

## 2025-02-12 RX ORDER — NALOXONE HYDROCHLORIDE 3 MG/.1ML
0.1 SPRAY NASAL
Refills: 0 | Status: DISCONTINUED | OUTPATIENT
Start: 2025-02-12 | End: 2025-02-13

## 2025-02-12 RX ORDER — CLOSTRIDIUM TETANI TOXOID ANTIGEN (FORMALDEHYDE INACTIVATED), CORYNEBACTERIUM DIPHTHERIAE TOXOID ANTIGEN (FORMALDEHYDE INACTIVATED), BORDETELLA PERTUSSIS TOXOID ANTIGEN (GLUTARALDEHYDE INACTIVATED), BORDETELLA PERTUSSIS FILAMENTOUS HEMAGGLUTININ ANTIGEN (FORMALDEHYDE INACTIVATED), BORDETELLA PERTUSSIS PERTACTIN ANTIGEN, AND BORDETELLA PERTUSSIS FIMBRIAE 2/3 ANTIGEN 5; 2; 2.5; 5; 3; 5 [LF]/.5ML; [LF]/.5ML; UG/.5ML; UG/.5ML; UG/.5ML; UG/.5ML
0.5 INJECTION, SUSPENSION INTRAMUSCULAR ONCE
Refills: 0 | Status: DISCONTINUED | OUTPATIENT
Start: 2025-02-12 | End: 2025-02-14

## 2025-02-12 RX ADMIN — FAMOTIDINE 20 MILLIGRAM(S): 10 INJECTION INTRAVENOUS at 07:50

## 2025-02-12 RX ADMIN — ACETAMINOPHEN 400 MILLIGRAM(S): 160 SUSPENSION ORAL at 13:19

## 2025-02-12 RX ADMIN — ACETAMINOPHEN 1000 MILLIGRAM(S): 160 SUSPENSION ORAL at 13:45

## 2025-02-12 RX ADMIN — ACETAMINOPHEN 975 MILLIGRAM(S): 160 SUSPENSION ORAL at 21:27

## 2025-02-12 RX ADMIN — Medication 5000 UNIT(S): at 18:14

## 2025-02-12 RX ADMIN — SODIUM CHLORIDE 200 MILLILITER(S): 9 INJECTION, SOLUTION INTRAVENOUS at 07:20

## 2025-02-12 RX ADMIN — ACETAMINOPHEN 975 MILLIGRAM(S): 160 SUSPENSION ORAL at 20:26

## 2025-02-12 RX ADMIN — SODIUM CHLORIDE 1000 MILLILITER(S): 9 INJECTION, SOLUTION INTRAVENOUS at 16:47

## 2025-02-12 RX ADMIN — Medication 30 MILLIGRAM(S): at 23:19

## 2025-02-12 RX ADMIN — Medication 30 MILLIGRAM(S): at 17:18

## 2025-02-12 RX ADMIN — METHYLERGONOVINE MALEATE 0.2 MILLIGRAM(S): 0.2 TABLET, COATED ORAL at 12:30

## 2025-02-12 RX ADMIN — METHYLERGONOVINE MALEATE 0.2 MILLIGRAM(S): 0.2 TABLET, COATED ORAL at 18:14

## 2025-02-12 RX ADMIN — METHYLERGONOVINE MALEATE 0.2 MILLIGRAM(S): 0.2 TABLET, COATED ORAL at 22:26

## 2025-02-12 RX ADMIN — SODIUM CHLORIDE 1000 MILLILITER(S): 9 INJECTION, SOLUTION INTRAVENOUS at 11:20

## 2025-02-12 RX ADMIN — SODIUM CITRATE AND CITRIC ACID MONOHYDRATE 15 MILLILITER(S): 1002; 1500 SOLUTION ORAL at 07:50

## 2025-02-12 NOTE — OB RN PATIENT PROFILE - NSICDXPASTSURGICALHX_GEN_ALL_CORE_FT
PAST SURGICAL HISTORY:  One previous induced termination of pregnancy     [place] teeth extracted

## 2025-02-12 NOTE — PRE-ANESTHESIA EVALUATION ADULT - NSANTHADDINFOFT_GEN_ALL_CORE
CSE, intrathecal duramorph explained to pt in detail;  risks include but no limited to HA, failure, nv injury. All questions answered.

## 2025-02-12 NOTE — OB PROVIDER H&P - NSICDXPASTSURGICALHX_GEN_ALL_CORE_FT
PAST SURGICAL HISTORY:  One previous induced termination of pregnancy     [place] teeth extracted

## 2025-02-12 NOTE — OB NEONATOLOGY/PEDIATRICIAN DELIVERY SUMMARY - NSPEDSNEONOTESA_OBGYN_ALL_OB_FT
Requested to attend primary scheduled C/S for suspected macrosomia at 40 weeks. Mother is a 34 year old  with prenatal labs neg, NR and immune, GBS neg, blood type O pos. AROM at delivery with light meconium noted. Infant emerged cephalic with good tone and cry. Warmed, stimulated and suctioned, apgars 8/9.

## 2025-02-12 NOTE — OB RN INTRAOPERATIVE NOTE - NS_UTERINECLOSURE_OBGYN_ALL_OB_DT
12-Feb-2025 09:20 Detail Level: Detailed Size Of Lesion In Cm (Optional): 0 Biopsy Type: H and E (Permanent Final Margin) Lab: [id number] Lab Facility: [id number] Path Notes Override (Will Replace All Of The Above Text): Consult for mohs surgery.  Slides and tissue stage I sections 1 and 2 and stage II sent along with tumor debulking.  Deep/true margins inked black.  SCC noted deep in subcutis and fascia on stage I both sections.  Margins appear clear on stage II.  Please consider special stains to evaluate for perineural scc. Render Path Notes In Note?: No Billing Type: Third-Party Bill

## 2025-02-12 NOTE — CHART NOTE - NSCHARTNOTEFT_GEN_A_CORE
PA Note:    S: Patient is a 34y  POD#0 s/p pLTCS with an EBL of 600cc.  Notified by RN that patient had increased bleeding.   Patient seen and evaluated at bedside. Patient feels well. She denies CP, SOB, palpitations, dizziness.  On exam, fundus noted to be firm and midline. Chux saturated underneath pt. When pt moved to clean chux, pt noted to have another gush of blood.   1000mcg Cytotec [place] given at this time.     About 10 minutes later, pt noted to saturate clean chux underneath pt again.  Methergine IM x1 given.    O:  Vital Signs Last 24 Hrs  T(C): 37.4 (2025 10:07), Max: 37.4 (2025 10:07)  T(F): 99.3 (2025 10:07), Max: 99.3 (2025 10:07)  HR: 82 (2025 11:45) (82 - 125)  BP: 111/76 (2025 11:45) (97/65 - 137/82)  BP(mean): 85 (2025 11:30) (74 - 85)  RR: 18 (2025 11:45) (18 - 18)  SpO2: 96% (2025 11:45) (94% - 99%)    Parameters below as of 2025 10:07  Patient On (Oxygen Delivery Method): room air    I&O's Summary    2025 07:01  -  2025 12:24  --------------------------------------------------------  IN: 0 mL / OUT: 990 mL / NET: -990 mL      Gen: NAD  Abdomen: Soft, nontender, non-distended, fundus firm  Incision: Clean, dry, and intact. Negative erythema/edema/ecchymosis. Sub Q  Ext: PAS in place. Negative Homans B/L    LABS:                          9.3    12.11 )-----------( 424      ( 2025 07:23 )             29.9       A/P:  34y POD#0 s/p pLTCS with a PPH of 250cc s/p cytotec [place] and methergine x1. Pt asx and VSS.   -Continue to monitor bleeding and VS  -Continue routine post op care and pain protocol    Dr. Martinez aware  Seema Leyva PA-C PA Note:    S: Patient is a 34y  POD#0 s/p pLTCS with an EBL of 600cc.  Notified by RN that patient had increased bleeding.   Patient seen and evaluated at bedside. Patient feels well. She denies CP, SOB, palpitations, dizziness.  On exam, fundus noted to be firm and midline. Chux saturated underneath pt. When pt moved to clean chux, pt noted to have another gush of blood.   1000mcg Cytotec SD given at this time.     About 10 minutes later, pt noted to saturate clean chux underneath pt again.  Methergine IM x1 given.    O:  Vital Signs Last 24 Hrs  T(C): 37.4 (2025 10:07), Max: 37.4 (2025 10:07)  T(F): 99.3 (2025 10:07), Max: 99.3 (2025 10:07)  HR: 82 (2025 11:45) (82 - 125)  BP: 111/76 (2025 11:45) (97/65 - 137/82)  BP(mean): 85 (2025 11:30) (74 - 85)  RR: 18 (2025 11:45) (18 - 18)  SpO2: 96% (2025 11:45) (94% - 99%)    Parameters below as of 2025 10:07  Patient On (Oxygen Delivery Method): room air    I&O's Summary    2025 07:01  -  2025 12:24  --------------------------------------------------------  IN: 0 mL / OUT: 990 mL / NET: -990 mL      Gen: NAD  Abdomen: Soft, nontender, non-distended, fundus firm  Incision: Clean, dry, and intact. Negative erythema/edema/ecchymosis. Sub Q  Ext: PAS in place. Negative Homans B/L    LABS:                          9.3    12.11 )-----------( 424      ( 2025 07:23 )             29.9       A/P:  34y POD#0 s/p pLTCS with a PPH of 250cc s/p cytotec SD and methergine x1. Pt asx and VSS.   -Continue to monitor bleeding and VS  -Continue routine post op care and pain protocol    Dr. Martinez aware  Seemasa Gordon LAZO  ----------------------------------  PA note (115pm):  Notified by RN that patient saturated another chux.  Patient seen and evaluated at bedside. Blue chux was noted to be saturated underneath pt. Fundus noted to be firm and midline w/ no gushes or trickling of blood noted. VSS.  Pt for methergine series and will continue to monitor.    CAMRON Leyva PA-C

## 2025-02-12 NOTE — OB PROVIDER H&P - HISTORY OF PRESENT ILLNESS
35yo  @ 40.0 weeks (DANIEL ) presents for scheduled pLTCS for suspected macrosomia. Pregnancy course uncomplicated. +FM, -LOF, -VB, -CTX    GBS negative  EFW     OBHx:   -  TOP with D&C  -  MAB with D&C  -  chemical pregnancy  GYNHx: No ovarian cysts, fibroids, hx of abnormal pap or STDs  PMHx: No hx of DM, HTN, asthma, bleeding or clotting disorders or blood transfusions.  PSurgHx: None  Allergies: NKDA  Meds: PNV  Social: No smoking, alcohol, or illicit drug use during pregnancy   Psych: No hx of anxiety or depression 33yo  @ 40.0 weeks (DANIEL ) presents for scheduled pLTCS for suspected macrosomia. Pregnancy course uncomplicated. +FM, -LOF, -VB, -CTX    GBS negative  EFW 4400    OBHx:   -  TOP with D&C  -  MAB with D&C  -  chemical pregnancy  GYNHx: No ovarian cysts, fibroids, hx of abnormal pap or STDs  PMHx: No hx of DM, HTN, asthma, bleeding or clotting disorders or blood transfusions.  PSurgHx: None  Allergies: NKDA  Meds: PNV  Social: No smoking, alcohol, or illicit drug use during pregnancy   Psych: No hx of anxiety or depression

## 2025-02-12 NOTE — OB PROVIDER H&P - ASSESSMENT
A/P: 35yo  @ 40.0 weeks for pLTCS 2/2 suspected macrosomia  - Admit to L&D  - Routine labs   - EFM/TOCO  - IVH/NPO  - Anesthesia consult   - Bicitra  - Famotidine  - for pLTCS    dw Dr Martinez  [facility]

## 2025-02-12 NOTE — OB PROVIDER DELIVERY SUMMARY - NSPROVIDERDELIVERYNOTE_OBGYN_ALL_OB_FT
scheduled pLTCS for suspected macrosomia   Viable female infant, apgars 9/9  Hysterotomy closed in 1 layer using PDS  Grossly normal uterus, tubes, and ovaries  Abdomen closed in standard fashion  Pt and infant to recovery in stable condition    QBL: 600  IVF: 2200    UOP: 150    Dictation # [id number]

## 2025-02-12 NOTE — OB PROVIDER DELIVERY SUMMARY - NSSELHIDDEN_OBGYN_ALL_OB_FT
[NS_DeliveryAttending1_OBGYN_ALL_OB_FT:[id number]],[NS_DeliveryAssist1_OBGYN_ALL_OB_FT:[id number]]

## 2025-02-12 NOTE — OB RN DELIVERY SUMMARY - NS_SEPSISRSKCALC_OBGYN_ALL_OB_FT
EOS calculated successfully. EOS Risk Factor: 0.5/1000 live births ([facility] national incidence); GA=40w;Temp=97.9; ROM=0.017; GBS='Negative'; Antibiotics='No antibiotics or any antibiotics < 2 hrs prior to birth'

## 2025-02-12 NOTE — OB NEONATOLOGY/PEDIATRICIAN DELIVERY SUMMARY - BABY A: APGAR 1 MIN RESP RATE, DELIVERY
====================================================   NEUROCRITICAL CARE ATTENDING NOTE   ====================================================    VEDA WARREN   MRN-[id number]  HPI: 62M with dyslipidemia, h/o Hep B infection, presented with acute onset severe HA, dizziness, vomiting - brought to [facility] where CT showed L cerebellar hemorrhage.  Intubated for airway protection.  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  R EVD inserted, transferred to [facility] for further management.  Given mannitol 25G in [facility].    COURSE IN THE HOSPITAL:   bleed   admitted to [facility], 23.4 given x1; OR for SOC evacuation of ICH   remained intubated overnight   tachycardia overnight, Tmax 38.4, ?PNA, ABx    Tmax 38.1 diuresed overnight 1.8L.     No significant events overnight.  CPAP this morning   Tmax 39.7 (enterobacter sputum, on zosyn)  08/10 Trial of CPAP   Neurologically stable, for extubation.   Extubated.  Neurologically stable.   Neurologically stable.   Febrile overnight.  Suctioned for secretions.  Continued neurological improvement.  08/15 Hypotensive overnight, resolved this morning.  Neurologically stable.  Continued diarrhea.   No significant events.  : febrile overnight.  failed repeat swallow eval this AM.   : bounced ack to ICU for thick secretions high sucitoning requirment  : suctioning reuqirement remains high. s/p PEg this morning.  Staring overnight at times, given 1g keppra.  : suctioning q1-2h. PEG yesterday, feeds started today.  : very weak cough, still high suctioning requirements  : stable overnight, this morning with fluctuating mental status, apparently staring ahead at times and others awake, talking and responsive.  At the time, tachycardic to 120-130, febrile to 102F, and BP SBP in 100's.     concern for aspiration of tube feeds given his restlessness and uncooperative state, TF held by RNs.     Overnight Events:  more awake, in mittens, NPO for OR     PHYSICAL EXAMINATION  T(C): 39.2 ( @ 08:33), Max: 39.2 ( @ 08:33)  HR: 117 ( @ 08:00) (82 - 118)  BP: 131/71 ( @ 08:00) (103/54 - 157/68)  RR: 22 ( @ 08:00) (16 - 33)  SpO2: 100% ( @ 08:00) (90% - 100%)  CVP(mm Hg): --    LABS:  CAPILLARY BLOOD GLUCOSE      POCT Blood Glucose.: 107 mg/dL (25 Aug 2020 06:17)  POCT Blood Glucose.: 160 mg/dL (24 Aug 2020 23:00)  POCT Blood Glucose.: 118 mg/dL (24 Aug 2020 17:43)  POCT Blood Glucose.: 186 mg/dL (24 Aug 2020 11:16)                          8.5    12.76 )-----------( 209      ( 25 Aug 2020 05:59 )             27.3         143  |  112<H>  |  8   ----------------------------<  83  3.0<L>   |  20<L>  |  0.47<L>    Ca    6.8<L>      25 Aug 2020 05:08  Phos  2.3       Mg     1.8      @ 07:01  -   @ 07:00  --------------------------------------------------------  IN: 2003 mL / OUT: 1850 mL / NET: 153 mL        MEDICATIONS:  MEDICATIONS  (STANDING):  acetylcysteine 20%  Inhalation 4 milliLiter(s) Inhalation every 6 hours  albuterol/ipratropium for Nebulization. 3 milliLiter(s) Nebulizer every 6 hours  artificial tears (preservative free) Ophthalmic Solution 1 Drop(s) Both EYES three times a day  cefepime   IVPB 2000 milliGRAM(s) IV Intermittent every 8 hours  chlorhexidine 2% Cloths 1 Application(s) Topical <User Schedule>  dextrose 5%. 1000 milliLiter(s) (50 mL/Hr) IV Continuous <Continuous>  dextrose 50% Injectable 12.5 Gram(s) IV Push once  dextrose 50% Injectable 25 Gram(s) IV Push once  doxazosin 4 milliGRAM(s) Oral at bedtime  finasteride 5 milliGRAM(s) Oral daily  insulin regular  human corrective regimen sliding scale.   SubCutaneous every 6 hours  insulin regular  human recombinant 4 Unit(s) SubCutaneous every 6 hours  lidocaine 4% Laryngotracheal Topical Anesthesia Kit 4 milliLiter(s) Oral Mucosa once  metoprolol tartrate 12.5 milliGRAM(s) Oral every 12 hours  potassium chloride   Powder 40 milliEquivalent(s) Oral every 4 hours  potassium phosphate / sodium phosphate Powder (PHOS-NaK) 1 Packet(s) Oral once  psyllium Powder 1 Packet(s) Oral daily  QUEtiapine 25 milliGRAM(s) Oral at bedtime  sodium chloride 0.9%. 1000 milliLiter(s) (70 mL/Hr) IV Continuous <Continuous>  tenofovir disoproxil fumarate (VIREAD) 300 milliGRAM(s) Oral every 24 hours  vancomycin  IVPB 1000 milliGRAM(s) IV Intermittent every 12 hours    MEDICATIONS  (PRN):  acetaminophen    Suspension .. 650 milliGRAM(s) Oral every 6 hours PRN Temp greater or equal to 38C (100.4F), Mild Pain (1 - 3)  dextrose 40% Gel 15 Gram(s) Oral once PRN Blood Glucose LESS THAN 70 milliGRAM(s)/deciliter  glucagon  Injectable 1 milliGRAM(s) IntraMuscular once PRN Glucose LESS THAN 70 milligrams/deciliter  glucagon  Injectable 1 milliGRAM(s) IntraMuscular once PRN Glucose LESS THAN 70 milligrams/deciliter  hydrALAZINE Injectable 10 milliGRAM(s) IV Push every 2 hours PRN SBP > 140  Mometasone Furoate 0.1% Ointment 1 Application(s) 1 Application(s) Topical two times a day PRN dry/itchy skin  sodium chloride 0.9% for Nebulization 3 milliLiter(s) Nebulizer three times a day PRN for congestion/secretions  sodium chloride 0.9% lock flush 10 milliLiter(s) IV Push every 1 hour PRN Pre/post blood products, medications, blood draw, and to maintain line patency          Past Medical History: Hepatitis B HLD (hyperlipidemia)  Allergies:  No Known Allergies  Home meds:   ·	Omega-3 350 mg oral capsule: 1 cap(s) orally once a day  ·	tenofovir disoproxil fumarate 300 mg oral tablet: 1 tab(s) orally once a day      PHYSICAL EXAMINATION    NEUROLOGIC EXAMINATION:    MSE: flat affect, no speech output when prompted, obeying first order commands  CN: pupils 3 mm reactive bilaterally, decreased EOM, weak cough, tongue midline  Motor: sitting on edge of bed, all extremities antigravity, LLE stronger than RLE in spontaneous movements  PULMONARY: coarse breath sounds, decreased bases  ABDOMEN: soft, nontender with normoactive bowel sounds, PEG in place    Neuroimagin/21 CT head:  Stable  1. Less edema and hemorrhage noted along prior right frontal ventricular catheter tract with near complete resolution of small amount of IVH seen previously. Ventricles are stable in size.  2. No change in the postoperative appearance of left suboccipital craniectomy for cerebellar hematoma evacuation. No new hemorrhage.   CT head:  Right frontal EVD catheter as above. The ventricles are stable in size from prior examination. No new or increasing intracranial hemorrhage or mass effect.  08/10 CT head:  Compared to 2020, there is improving intraventricular hemorrhage, resolution of intraventricular gas, and mild decrease in ventricular size. Postsurgical change and edema in the posterior fossa is approximately stable.   CT head:  post-surgical changes, stable HCP  Other imagin/20 LE doppler: NEG   LE doppler: NEG   CXR:  clear   CXR:  clear   CXR:  mild atelectasis L lung base  08/10 CXR:  Small left pleural effusion, unchanged.   CXR:  Discoid change right lung field. Right basilar infiltrate cannot be excluded ====================================================   NEUROCRITICAL CARE ATTENDING NOTE   ====================================================    VEDA WARREN   MRN-[id number]  HPI: 62M with dyslipidemia, h/o Hep B infection, presented with acute onset severe HA, dizziness, vomiting - brought to [facility] where CT showed L cerebellar hemorrhage.  Intubated for airway protection.  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  R EVD inserted, transferred to [facility] for further management.  Given mannitol 25G in [facility].    COURSE IN THE HOSPITAL:   bleed   admitted to [facility], 23.4 given x1; OR for SOC evacuation of ICH   remained intubated overnight   tachycardia overnight, Tmax 38.4, ?PNA, ABx    Tmax 38.1 diuresed overnight 1.8L.     No significant events overnight.  CPAP this morning   Tmax 39.7 (enterobacter sputum, on zosyn)  08/10 Trial of CPAP   Neurologically stable, for extubation.   Extubated.  Neurologically stable.   Neurologically stable.   Febrile overnight.  Suctioned for secretions.  Continued neurological improvement.  08/15 Hypotensive overnight, resolved this morning.  Neurologically stable.  Continued diarrhea.   No significant events.  : febrile overnight.  failed repeat swallow eval this AM.   : bounced ack to ICU for thick secretions high sucitoning requirment  : suctioning reuqirement remains high. s/p PEg this morning.  Staring overnight at times, given 1g keppra.  : suctioning q1-2h. PEG yesterday, feeds started today.  : very weak cough, still high suctioning requirements  : stable overnight, this morning with fluctuating mental status, apparently staring ahead at times and others awake, talking and responsive.  At the time, tachycardic to 120-130, febrile to 102F, and BP SBP in 100's.     concern for aspiration of tube feeds given his restlessness and uncooperative state, TF held by RNs.     Overnight Events:  more awake, in mittens, NPO for OR     PHYSICAL EXAMINATION  T(C): 39.2 ( @ 08:33), Max: 39.2 ( @ 08:33)  HR: 117 ( @ 08:00) (82 - 118)  BP: 131/71 ( @ 08:00) (103/54 - 157/68)  RR: 22 ( @ 08:00) (16 - 33)  SpO2: 100% ( @ 08:00) (90% - 100%)    LABS:  CAPILLARY BLOOD GLUCOSE      POCT Blood Glucose.: 107 mg/dL (25 Aug 2020 06:17)  POCT Blood Glucose.: 160 mg/dL (24 Aug 2020 23:00)  POCT Blood Glucose.: 118 mg/dL (24 Aug 2020 17:43)  POCT Blood Glucose.: 186 mg/dL (24 Aug 2020 11:16)                          8.5    12.76 )-----------( 209      ( 25 Aug 2020 05:59 )             27.3         143  |  112<H>  |  8   ----------------------------<  83  3.0<L>   |  20<L>  |  0.47<L>    Ca    6.8<L>      25 Aug 2020 05:08  Phos  2.3       Mg     1.8      @ 07:01  -   @ 07:00  --------------------------------------------------------  IN: 2003 mL / OUT: 1850 mL / NET: 153 mL        MEDICATIONS:  MEDICATIONS  (STANDING):  acetylcysteine 20%  Inhalation 4 milliLiter(s) Inhalation every 6 hours  albuterol/ipratropium for Nebulization. 3 milliLiter(s) Nebulizer every 6 hours  artificial tears (preservative free) Ophthalmic Solution 1 Drop(s) Both EYES three times a day  cefepime   IVPB 2000 milliGRAM(s) IV Intermittent every 8 hours  chlorhexidine 2% Cloths 1 Application(s) Topical <User Schedule>  dextrose 5%. 1000 milliLiter(s) (50 mL/Hr) IV Continuous <Continuous>  dextrose 50% Injectable 12.5 Gram(s) IV Push once  dextrose 50% Injectable 25 Gram(s) IV Push once  doxazosin 4 milliGRAM(s) Oral at bedtime  finasteride 5 milliGRAM(s) Oral daily  insulin regular  human corrective regimen sliding scale.   SubCutaneous every 6 hours  insulin regular  human recombinant 4 Unit(s) SubCutaneous every 6 hours  lidocaine 4% Laryngotracheal Topical Anesthesia Kit 4 milliLiter(s) Oral Mucosa once  metoprolol tartrate 12.5 milliGRAM(s) Oral every 12 hours  potassium chloride   Powder 40 milliEquivalent(s) Oral every 4 hours  potassium phosphate / sodium phosphate Powder (PHOS-NaK) 1 Packet(s) Oral once  psyllium Powder 1 Packet(s) Oral daily  QUEtiapine 25 milliGRAM(s) Oral at bedtime  sodium chloride 0.9%. 1000 milliLiter(s) (70 mL/Hr) IV Continuous <Continuous>  tenofovir disoproxil fumarate (VIREAD) 300 milliGRAM(s) Oral every 24 hours  vancomycin  IVPB 1000 milliGRAM(s) IV Intermittent every 12 hours    MEDICATIONS  (PRN):  acetaminophen    Suspension .. 650 milliGRAM(s) Oral every 6 hours PRN Temp greater or equal to 38C (100.4F), Mild Pain (1 - 3)  dextrose 40% Gel 15 Gram(s) Oral once PRN Blood Glucose LESS THAN 70 milliGRAM(s)/deciliter  glucagon  Injectable 1 milliGRAM(s) IntraMuscular once PRN Glucose LESS THAN 70 milligrams/deciliter  glucagon  Injectable 1 milliGRAM(s) IntraMuscular once PRN Glucose LESS THAN 70 milligrams/deciliter  hydrALAZINE Injectable 10 milliGRAM(s) IV Push every 2 hours PRN SBP > 140  Mometasone Furoate 0.1% Ointment 1 Application(s) 1 Application(s) Topical two times a day PRN dry/itchy skin  sodium chloride 0.9% for Nebulization 3 milliLiter(s) Nebulizer three times a day PRN for congestion/secretions  sodium chloride 0.9% lock flush 10 milliLiter(s) IV Push every 1 hour PRN Pre/post blood products, medications, blood draw, and to maintain line patency          Past Medical History: Hepatitis B HLD (hyperlipidemia)  Allergies:  No Known Allergies  Home meds:   ·	Omega-3 350 mg oral capsule: 1 cap(s) orally once a day  ·	tenofovir disoproxil fumarate 300 mg oral tablet: 1 tab(s) orally once a day      PHYSICAL EXAMINATION    NEUROLOGIC EXAMINATION:    MSE: flat affect, no speech output when prompted, obeying first order commands  CN: pupils 3 mm reactive bilaterally, decreased EOM, weak cough, tongue midline  Motor: sitting on edge of bed, all extremities antigravity, LLE stronger than RLE in spontaneous movements  PULMONARY: coarse breath sounds, decreased bases  ABDOMEN: soft, nontender with normoactive bowel sounds, PEG in place    Neuroimagin/21 CT head:  Stable  1. Less edema and hemorrhage noted along prior right frontal ventricular catheter tract with near complete resolution of small amount of IVH seen previously. Ventricles are stable in size.  2. No change in the postoperative appearance of left suboccipital craniectomy for cerebellar hematoma evacuation. No new hemorrhage.   CT head:  Right frontal EVD catheter as above. The ventricles are stable in size from prior examination. No new or increasing intracranial hemorrhage or mass effect.  08/10 CT head:  Compared to 2020, there is improving intraventricular hemorrhage, resolution of intraventricular gas, and mild decrease in ventricular size. Postsurgical change and edema in the posterior fossa is approximately stable.   CT head:  post-surgical changes, stable HCP  Other imagin/20 LE doppler: NEG   LE doppler: NEG   CXR:  clear   CXR:  clear   CXR:  mild atelectasis L lung base  08/10 CXR:  Small left pleural effusion, unchanged.   CXR:  Discoid change right lung field. Right basilar infiltrate cannot be excluded (2) good, crying

## 2025-02-12 NOTE — OB RN INTRAOPERATIVE NOTE - NSSELHIDDEN_OBGYN_ALL_OB_FT
[[email or web address]_ALL_OB_FT:[id number]],[NS_DeliveryAssist1_OBGYN_ALL_OB_FT:[id number]]

## 2025-02-12 NOTE — OB PROVIDER DELIVERY SUMMARY - NS_GESTAGEATBIRTHA_OBGYN_ALL_OB_FT
Patient remains at SNF for rehab, Patient has not return to ED/Hosp in the last 30 days. Will sign off at this time. 40w

## 2025-02-12 NOTE — OB RN PATIENT PROFILE - BREASTFEEDING MOTHER TAUGHT HOW TO HAND EXPRESS THEIR OWN MILK
Therapy:IV CHEMO  Insurance: [employer] BLUE  Ded: $0  Met: $0    Co-Insurance: 0  Max Out of Pocket: $0  Met: $0    Please contact Intake with any questions, [phone] or In Basket pool, [facility] ([phone]).    In reference to referral on 3/26/18 to check iv chemo benefits.  
Statement Selected

## 2025-02-12 NOTE — OB RN PREOPERATIVE CHECKLIST - NS PREOP CHK CHLOROHEX WASH
Addended by: FILIBERTO MANUEL on: 2/10/2020 10:54 AM     Modules accepted: Level of Service     in ASU:

## 2025-02-12 NOTE — OB RN DELIVERY SUMMARY - NSSELHIDDEN_OBGYN_ALL_OB_FT
[NS_DeliveryAttending1_OBGYN_ALL_OB_FT:[id number]],[NS_DeliveryAssist1_OBGYN_ALL_OB_FT:[id number]]

## 2025-02-12 NOTE — OB PROVIDER H&P - NSHPPHYSICALEXAM_GEN_ALL_CORE
PE:  T(C): 36.4 (02-11-25 @ 17:31), Max: 36.4 (02-11-25 @ 17:31)  HR: 124 (02-11-25 @ 17:31) (124 - 124)  BP: 125/87 (02-11-25 @ 17:31) (125/87 - 125/87)  RR: 18 (02-11-25 @ 17:31) (18 - 18)  SpO2: 97% (02-11-25 @ 17:31) (97% - 97%)  General: NAD, A&Ox3  CV: RRR  Lungs: Clear bilat   Abd: soft, nontender, gravid  VE: deferred  EFM: /moderate variablity/+accels/-decels  TOCO: PE:  T(C): 36.4 (02-11-25 @ 17:31), Max: 36.4 (02-11-25 @ 17:31)  HR: 124 (02-11-25 @ 17:31) (124 - 124)  BP: 125/87 (02-11-25 @ 17:31) (125/87 - 125/87)  RR: 18 (02-11-25 @ 17:31) (18 - 18)  SpO2: 97% (02-11-25 @ 17:31) (97% - 97%)  General: NAD, A&Ox3  CV: RRR  Lungs: Clear bilat   Abd: soft, nontender, gravid  VE: deferred  EFM: 145/moderate variablity/+accels/-decels  TOCO: acontractile

## 2025-02-12 NOTE — OB RN PREOPERATIVE CHECKLIST - BLOOD AVAILABLE
I, Garrett Shelton the attending physician, reviewed the above history and physical.  I have reviewed the appropriate medical and surgical history and reviewed the imaging independently as well performed an independent physical exam.  I have reviewed the above note and agree with the its findings and the plan as written with any exceptions noted below.     86 female GLF- sustained a right intertrochanteric femur fracture  - Recommend IMN right femur. Long discussion was had with the patient and her family. Patient optimized for surgery by medicine.    We discussed the risk and benefits of operative and nonoperative treatment options with the patient. Risks of surgery were discussed and include, but are not limited to, pain, infection, bleeding, nonunion, malunion, instability / dislocation, symptomatic hardware, limb length discrepancy, loss of motion, loss of strength, loss of function, need for revision surgery, damage to nerves and/or blood vessels, anesthetic risks, DVT, PE, MI, CVA, and even death. All questions were answered and informed consent was signed using the teach back method.    Garrett Shelton MD  Attending Orthopedic Surgeon yes

## 2025-02-13 LAB
BASOPHILS # BLD AUTO: 0.04 K/UL — SIGNIFICANT CHANGE UP (ref 0–0.2)
BASOPHILS NFR BLD AUTO: 0.2 % — SIGNIFICANT CHANGE UP (ref 0–2)
EOSINOPHIL # BLD AUTO: 0.02 K/UL — SIGNIFICANT CHANGE UP (ref 0–0.5)
EOSINOPHIL NFR BLD AUTO: 0.1 % — SIGNIFICANT CHANGE UP (ref 0–6)
HCT VFR BLD CALC: 21.9 % — LOW (ref 34.5–45)
HCT VFR BLD CALC: 27.2 % — LOW (ref 34.5–45)
HGB BLD-MCNC: 6.9 G/DL — CRITICAL LOW (ref 11.5–15.5)
HGB BLD-MCNC: 8.6 G/DL — LOW (ref 11.5–15.5)
IMM GRANULOCYTES NFR BLD AUTO: 0.7 % — SIGNIFICANT CHANGE UP (ref 0–0.9)
LYMPHOCYTES # BLD AUTO: 17.3 % — SIGNIFICANT CHANGE UP (ref 13–44)
LYMPHOCYTES # BLD AUTO: 3.1 K/UL — SIGNIFICANT CHANGE UP (ref 1–3.3)
MCHC RBC-ENTMCNC: 26.3 PG — LOW (ref 27–34)
MCHC RBC-ENTMCNC: 27 PG — SIGNIFICANT CHANGE UP (ref 27–34)
MCHC RBC-ENTMCNC: 31.5 G/DL — LOW (ref 32–36)
MCHC RBC-ENTMCNC: 31.6 G/DL — LOW (ref 32–36)
MCV RBC AUTO: 83.6 FL — SIGNIFICANT CHANGE UP (ref 80–100)
MCV RBC AUTO: 85.3 FL — SIGNIFICANT CHANGE UP (ref 80–100)
MONOCYTES # BLD AUTO: 1.47 K/UL — HIGH (ref 0–0.9)
MONOCYTES NFR BLD AUTO: 8.2 % — SIGNIFICANT CHANGE UP (ref 2–14)
NEUTROPHILS # BLD AUTO: 13.18 K/UL — HIGH (ref 1.8–7.4)
NEUTROPHILS NFR BLD AUTO: 73.5 % — SIGNIFICANT CHANGE UP (ref 43–77)
NRBC BLD AUTO-RTO: 0 /100 WBCS — SIGNIFICANT CHANGE UP (ref 0–0)
NRBC BLD AUTO-RTO: 0 /100 WBCS — SIGNIFICANT CHANGE UP (ref 0–0)
PLATELET # BLD AUTO: 346 K/UL — SIGNIFICANT CHANGE UP (ref 150–400)
PLATELET # BLD AUTO: 403 K/UL — HIGH (ref 150–400)
RBC # BLD: 2.62 M/UL — LOW (ref 3.8–5.2)
RBC # BLD: 3.19 M/UL — LOW (ref 3.8–5.2)
RBC # FLD: 14.2 % — SIGNIFICANT CHANGE UP (ref 10.3–14.5)
RBC # FLD: 14.9 % — HIGH (ref 10.3–14.5)
WBC # BLD: 17.93 K/UL — HIGH (ref 3.8–10.5)
WBC # BLD: 21.59 K/UL — HIGH (ref 3.8–10.5)
WBC # FLD AUTO: 17.93 K/UL — HIGH (ref 3.8–10.5)
WBC # FLD AUTO: 21.59 K/UL — HIGH (ref 3.8–10.5)

## 2025-02-13 RX ORDER — IBUPROFEN 600 MG/1
600 TABLET, FILM COATED ORAL EVERY 6 HOURS
Refills: 0 | Status: DISCONTINUED | OUTPATIENT
Start: 2025-02-13 | End: 2025-02-14

## 2025-02-13 RX ORDER — DIPHENHYDRAMINE HCL 25 MG
25 CAPSULE ORAL EVERY 6 HOURS
Refills: 0 | Status: DISCONTINUED | OUTPATIENT
Start: 2025-02-13 | End: 2025-02-14

## 2025-02-13 RX ORDER — OXYCODONE HYDROCHLORIDE 30 MG/1
5 TABLET ORAL
Refills: 0 | Status: DISCONTINUED | OUTPATIENT
Start: 2025-02-13 | End: 2025-02-14

## 2025-02-13 RX ADMIN — ACETAMINOPHEN 975 MILLIGRAM(S): 160 SUSPENSION ORAL at 20:14

## 2025-02-13 RX ADMIN — ACETAMINOPHEN 975 MILLIGRAM(S): 160 SUSPENSION ORAL at 14:39

## 2025-02-13 RX ADMIN — IBUPROFEN 600 MILLIGRAM(S): 600 TABLET, FILM COATED ORAL at 23:29

## 2025-02-13 RX ADMIN — Medication 30 MILLIGRAM(S): at 06:02

## 2025-02-13 RX ADMIN — Medication 30 MILLIGRAM(S): at 00:24

## 2025-02-13 RX ADMIN — Medication 30 MILLILITER(S): at 20:14

## 2025-02-13 RX ADMIN — ACETAMINOPHEN 975 MILLIGRAM(S): 160 SUSPENSION ORAL at 08:51

## 2025-02-13 RX ADMIN — IBUPROFEN 600 MILLIGRAM(S): 600 TABLET, FILM COATED ORAL at 17:39

## 2025-02-13 RX ADMIN — Medication 5000 UNIT(S): at 06:02

## 2025-02-13 RX ADMIN — ACETAMINOPHEN 975 MILLIGRAM(S): 160 SUSPENSION ORAL at 03:40

## 2025-02-13 RX ADMIN — METHYLERGONOVINE MALEATE 0.2 MILLIGRAM(S): 0.2 TABLET, COATED ORAL at 02:32

## 2025-02-13 RX ADMIN — ACETAMINOPHEN 975 MILLIGRAM(S): 160 SUSPENSION ORAL at 20:44

## 2025-02-13 RX ADMIN — METHYLERGONOVINE MALEATE 0.2 MILLIGRAM(S): 0.2 TABLET, COATED ORAL at 06:02

## 2025-02-13 RX ADMIN — Medication 5000 UNIT(S): at 17:39

## 2025-02-13 RX ADMIN — Medication 30 MILLIGRAM(S): at 07:16

## 2025-02-13 RX ADMIN — Medication 25 MILLIGRAM(S): at 08:51

## 2025-02-13 RX ADMIN — METHYLERGONOVINE MALEATE 0.2 MILLIGRAM(S): 0.2 TABLET, COATED ORAL at 11:59

## 2025-02-13 RX ADMIN — Medication 30 MILLIGRAM(S): at 13:15

## 2025-02-13 RX ADMIN — ACETAMINOPHEN 975 MILLIGRAM(S): 160 SUSPENSION ORAL at 02:32

## 2025-02-13 RX ADMIN — Medication 80 MILLIGRAM(S): at 17:08

## 2025-02-13 NOTE — CHART NOTE - NSCHARTNOTEFT_GEN_A_CORE
35yo POD#1 s/p pLTCS with AM CBC H/H 6.9/21. Pt seen this morning by PM resident and Dr. Martinez, asymptomatic. Vital signs within normal limits. Will plan for 1u pRBCs per Dr. Martinez. Unit ordered and will plan for 4hour post-transfusion CBC.    Tere Servin, PGY-1  Discussed with Dr. Martinez

## 2025-02-13 NOTE — PROGRESS NOTE ADULT - ASSESSMENT
A/P: 33yo POD#1 s/p LTCS.  Patient is stable and doing well post-operatively.    #PP anemia  -  w/250 cc addl bleeding in recovery room  - Methergine series (2/12-), s/p cytoPR  - Hct: 29.9->21.9  - Pt endorsing lightheadedness/dizziness w/standing,     #pLTCS  - Continue regular diet.  - Increase ambulation.  - Continue motrin, tylenol, oxycodone PRN for pain control.     Shantanu Estrada PGY1 A/P: 35yo POD#1 s/p LTCS.  Patient is stable and doing well post-operatively.    #PP anemia  -  w/250 cc addl bleeding in recovery room  - Methergine series (2/12-), s/p cytoPR  - Hct: 29.9->21.9  - Pt endorsing lightheadedness/dizziness w/standing, denies SOB/palpitations  - Offer pRBC transfusion w/post-transfusion CBC    #pLTCS  - Continue regular diet.  - Increase ambulation.  - Continue motrin, tylenol, oxycodone PRN for pain control.     Shantaun Estrada PGY1

## 2025-02-14 VITALS
SYSTOLIC BLOOD PRESSURE: 108 MMHG | DIASTOLIC BLOOD PRESSURE: 72 MMHG | HEART RATE: 82 BPM | RESPIRATION RATE: 18 BRPM | OXYGEN SATURATION: 99 % | TEMPERATURE: 97 F

## 2025-02-14 PROCEDURE — 86923 COMPATIBILITY TEST ELECTRIC: CPT

## 2025-02-14 PROCEDURE — 36430 TRANSFUSION BLD/BLD COMPNT: CPT

## 2025-02-14 PROCEDURE — P9016: CPT

## 2025-02-14 PROCEDURE — 86850 RBC ANTIBODY SCREEN: CPT

## 2025-02-14 PROCEDURE — 86901 BLOOD TYPING SEROLOGIC RH(D): CPT

## 2025-02-14 PROCEDURE — 85025 COMPLETE CBC W/AUTO DIFF WBC: CPT

## 2025-02-14 PROCEDURE — 59025 FETAL NON-STRESS TEST: CPT

## 2025-02-14 PROCEDURE — 59050 FETAL MONITOR W/REPORT: CPT

## 2025-02-14 PROCEDURE — 86780 TREPONEMA PALLIDUM: CPT

## 2025-02-14 PROCEDURE — 86900 BLOOD TYPING SEROLOGIC ABO: CPT

## 2025-02-14 PROCEDURE — 85027 COMPLETE CBC AUTOMATED: CPT

## 2025-02-14 PROCEDURE — 36415 COLL VENOUS BLD VENIPUNCTURE: CPT

## 2025-02-14 RX ORDER — ACETAMINOPHEN 160 MG/5ML
3 SUSPENSION ORAL
Qty: 0 | Refills: 0 | DISCHARGE
Start: 2025-02-14

## 2025-02-14 RX ORDER — IBUPROFEN 600 MG/1
1 TABLET, FILM COATED ORAL
Qty: 0 | Refills: 0 | DISCHARGE
Start: 2025-02-14

## 2025-02-14 RX ADMIN — ACETAMINOPHEN 975 MILLIGRAM(S): 160 SUSPENSION ORAL at 02:21

## 2025-02-14 RX ADMIN — Medication 5000 UNIT(S): at 06:07

## 2025-02-14 RX ADMIN — IBUPROFEN 600 MILLIGRAM(S): 600 TABLET, FILM COATED ORAL at 06:07

## 2025-02-14 RX ADMIN — ACETAMINOPHEN 975 MILLIGRAM(S): 160 SUSPENSION ORAL at 02:51

## 2025-02-14 RX ADMIN — IBUPROFEN 600 MILLIGRAM(S): 600 TABLET, FILM COATED ORAL at 14:07

## 2025-02-14 RX ADMIN — ACETAMINOPHEN 975 MILLIGRAM(S): 160 SUSPENSION ORAL at 08:49

## 2025-02-14 NOTE — DISCHARGE NOTE OB - CARE PROVIDER_API CALL
Feliberto Acuña  Obstetrics and Gynecology  [address], [address]  [place] [address]  Phone: [phone]  Fax: [phone]  Follow Up Time:

## 2025-02-14 NOTE — PROGRESS NOTE ADULT - SUBJECTIVE AND OBJECTIVE BOX
Day 0 of Anesthesia Pain Management Service    SUBJECTIVE:  Pain Scale Score:          [X] Refer to charted pain scores    THERAPY: Received PF neuraxial morphine as per pain service nurse's note    OBJECTIVE:    Sedation:        	[X] Alert  	[ ] Drowsy	[ ] Arousable      [ ] Asleep       [ ] Unresponsive    Side Effects:	[X] None	[ ] Nausea	[ ] Vomiting         [ ] Pruritus  		[ ] Weakness            [ ] Numbness	          [ ] Other:    ASSESSMENT/ PLAN  [  ] Patient transitioned to prn analgesics  [  ] Pain management per primary service, pain service to sign off   [X]Documentation and Verification of current medications
Day 1 of Anesthesia Pain Management Service    SUBJECTIVE:  Pain Scale Score:          [X] Refer to charted pain scores    THERAPY: Received PF neuraxial morphine as per pain service nurse's note    OBJECTIVE:    Sedation:        	[X] Alert	[ ] Drowsy	[ ] Arousable      [ ] Asleep       [ ] Unresponsive    Side Effects:	[X] None	[ ] Nausea	[ ] Vomiting         [ ] Pruritus  		[ ] Weakness            [ ] Numbness	          [ ] Other:    ASSESSMENT/ PLAN  [X] Patient transitioned to prn analgesics  [X] Pain management per primary service, pain service to sign off   [X]Documentation and Verification of current medications
Day 1 of Anesthesia Pain Management Service    SUBJECTIVE: Doing ok  Pain Scale Score:          [X] Refer to charted pain scores    THERAPY:    s/p    100 mcg PF morphine on 2\12\25       MEDICATIONS  (STANDING):  acetaminophen     Tablet .. 975 milliGRAM(s) Oral <User Schedule>  diphtheria/tetanus/pertussis (acellular) Vaccine (Adacel) 0.5 milliLiter(s) IntraMuscular once  heparin   Injectable 5000 Unit(s) SubCutaneous two times a day  ibuprofen  Tablet. 600 milliGRAM(s) Oral every 6 hours  ketorolac   Injectable 30 milliGRAM(s) IV Push every 6 hours  lactated ringers. 1000 milliLiter(s) (125 mL/Hr) IV Continuous <Continuous>  lactated ringers. 1000 milliLiter(s) (200 mL/Hr) IV Continuous <Continuous>  methylergonovine 0.2 milliGRAM(s) Oral every 4 hours  morphine PF Spinal 0.1 milliGRAM(s) IntraThecal. once  oxytocin Infusion 42 milliUNIT(s)/Min (42 mL/Hr) IV Continuous <Continuous>    MEDICATIONS  (PRN):  dexAMETHasone  Injectable 4 milliGRAM(s) IV Push every 6 hours PRN Nausea  diphenhydrAMINE 25 milliGRAM(s) Oral every 6 hours PRN Pruritus  lanolin Ointment 1 Application(s) Topical every 6 hours PRN Sore Nipples  magnesium hydroxide Suspension 30 milliLiter(s) Oral two times a day PRN Constipation  nalbuphine Injectable 2.5 milliGRAM(s) IV Push every 6 hours PRN Pruritus  naloxone Injectable 0.1 milliGRAM(s) IV Push every 3 minutes PRN For ANY of the following changes in patient status:  A. Breaths Per Minute LESS THAN 10, B. Oxygen saturation LESS THAN 90%, C. Sedation score of 6 for Stop After: 4 Times  ondansetron Injectable 4 milliGRAM(s) IV Push every 6 hours PRN Nausea  oxyCODONE    IR 5 milliGRAM(s) Oral every 3 hours PRN Mild Pain (1 - 3)  oxyCODONE    IR 10 milliGRAM(s) Oral every 3 hours PRN Moderate Pain (4 - 6)  oxyCODONE    IR 5 milliGRAM(s) Oral every 3 hours PRN Moderate to Severe Pain (4-10)  simethicone 80 milliGRAM(s) Chew every 4 hours PRN Gas      OBJECTIVE:    Sedation:        	[X] Alert	 [ ] Drowsy	[ ] Arousable      [ ] Asleep       [ ] Unresponsive    Side Effects:	[X] None 	[ ] Nausea	[ ] Vomiting         [ ] Pruritus  		[ ] Weakness            [ ] Numbness	          [ ] Other:    Vital Signs Last 24 Hrs  T(C): 36.5 (13 Feb 2025 08:00), Max: 37.4 (12 Feb 2025 10:07)  T(F): 97.7 (13 Feb 2025 08:00), Max: 99.3 (12 Feb 2025 10:07)  HR: 90 (13 Feb 2025 08:00) (79 - 123)  BP: 110/75 (13 Feb 2025 08:00) (97/65 - 143/70)  BP(mean): 86 (13 Feb 2025 08:00) (74 - 110)  RR: 18 (13 Feb 2025 08:00) (17 - 18)  SpO2: 95% (13 Feb 2025 08:00) (94% - 100%)    Parameters below as of 13 Feb 2025 05:33  Patient On (Oxygen Delivery Method): room air        ASSESSMENT/ PLAN  [X] Patient transitioned to prn analgesics  [X] Pain management per primary service, pain service to sign off   [X]Documentation and Verification of current medications
OB Progress Note:  Delivery, POD#1    S: 33yo POD#1 s/p pLTCS. Her pain is well controlled. She is tolerating a regular diet and passing flatus. Endorses light vaginal bleeding, less than one pad per hour. She is ambulating without difficulty. Voiding spontaneously. Pt s/p 1u pRBC for symptomatic anemia. Denies palpitations, SOB, lightheadedness/dizziness.     O:   Vital Signs Last 24 Hrs  T(C): 36.8 (2025 05:59), Max: 36.8 (2025 10:15)  T(F): 98.3 (2025 05:59), Max: 98.3 (2025 05:59)  HR: 82 (2025 05:59) (82 - 102)  BP: 105/70 (2025 05:59) (94/65 - 131/87)  BP(mean): 86 (2025 08:00) (86 - 86)  RR: 18 (2025 05:59) (18 - 18)  SpO2: 97% (2025 05:59) (95% - 99%)    Parameters below as of 2025 05:59  Patient On (Oxygen Delivery Method): room air        Labs:  Blood type: O Positive  Rubella IgG: RPR: Negative                          8.6[L]   21.59[H] >-----------< 403[H]    (  @ 19:45 )             27.2[L]                        6.9[LL]   17.93[H] >-----------< 346    (  @ 06:30 )             21.9[L]                        9.3[L]   12.11[H] >-----------< 424[H]    (  @ 07:23 )             29.9[L]                        9.3[L]   11.72[H] >-----------< 413[H]    (  @ 19:16 )             30.2[L]    25 @ 19:16      135  |  103  |  6[L]  ----------------------------<  82  3.9   |  19[L]  |  0.63        Ca    9.9      2025 19:16            PE:  General: NAD  Heart: extremities well-perfused  Lungs: breathing comfortably  Abdomen: Soft, incision c/d/i, fundus firm  Extremities: No calf tenderness to palpation    
OB Progress Note:  Delivery, POD#1    S: 35yo POD#1 s/p pLTCS. Her pain is well controlled. She is tolerating a regular diet, not yet passing flatus. Endorses light vaginal bleeding, less than one pad per hour. She is ambulating without difficulty. She endorses minimal lightheadedness/dizziness upon first standing, resolving spontaneously. Voiding spontaneously.     O:   Vital Signs Last 24 Hrs  T(C): 36.6 (2025 05:33), Max: 37.4 (2025 10:07)  T(F): 97.9 (2025 05:33), Max: 99.3 (2025 10:07)  HR: 79 (2025 05:33) (79 - 123)  BP: 110/75 (2025 05:33) (97/65 - 143/70)  BP(mean): 84 (2025 17:45) (74 - 110)  RR: 18 (:33) (17 - 18)  SpO2: 97% (2025 05:33) (94% - 100%)    Parameters below as of 2025 05:33  Patient On (Oxygen Delivery Method): room air        Labs:  Blood type: O Positive  Rubella IgG: RPR: Negative                          6.9[LL]   17.93[H] >-----------< 346    (  @ 06:30 )             21.9[L]                        9.3[L]   12.11[H] >-----------< 424[H]    (  @ 07:23 )             29.9[L]                        9.3[L]   11.72[H] >-----------< 413[H]    (  @ 19:16 )             30.2[L]    25 @ 19:16      135  |  103  |  6[L]  ----------------------------<  82  3.9   |  19[L]  |  0.63        Ca    9.9      2025 19:16            PE:  General: NAD  Heart: extremities well-perfused  Lungs: breathing comfortably  Abdomen: Soft, incision c/d/i, fundus firm  Extremities: No calf tenderness to palpation

## 2025-02-14 NOTE — DISCHARGE NOTE OB - NS MD DC FALL RISK RISK
For information on Fall & Injury Prevention, visit: [email or web address] OR  [email or web address] OR  https://www.cdc.gov/steadi/patient.html

## 2025-02-14 NOTE — DISCHARGE NOTE OB - MEDICATION SUMMARY - MEDICATIONS TO TAKE
Outpatient Speech Language Pathology   Peds Speech Language Treatment Note      Today's Visit Information         Patient Name: Yousuf Lambert      : 2017      MRN: [id number]           Visit Date: 2021          Visit Dx:  (R62.0) Delayed milestones    (F80.9) Developmental disorder of speech and language, unspecified    (F80.9) Speech delay    (F80.2) Receptive language delay    (F80.1) Expressive language delay     Subjective    • Yousuf was seen for speech and language therapy on today's date. He transitioned to go with the therapist without difficulty. Yousuf was accompanied to the session by his father.    • Behavior(s) observed this date: alert, awake, cooperative and happy.    Objective    • Activities addressed during today's session:  Book sharing, Floor Play, Reciprocal Play Activities, Sensory Motor Play, Routine speech games, Parent Education, Verbal Routines and Pecan [place] puzzle.    • Skilled therapeutic strategies incorporated by Speech Language Pathologist during today's session:  o Language Therapy Strategies: Auditory cues, Caregiver Education, Chaining, Modeling, Parallel play, Parallel talk, Prompting Hierarchy, Reciprocal Play, Self-talk and Sign language.    • Home program activities:   Discussed with caregiver and/or sent home program activities in speech folder including: Language acquisition activities, Early language carryover techniques and Instructions for carryover of targeted skills into Activities of Daily Living to facilitate generalization of skills to new environments.     Speech Goals    1. Pragmatics   LTG 1: Corey will demonstrate age appropriate pragmatics skills in all activities of daily living.    STATUS:  PROGRESSING      STG 1a: Corey will demonstrate joint attention during sensory motor play interactions for 30 seconds 1x per session for 3 consecutive sessions.    STATUS:  PROGRESSING   2021: 10x+, 1 sec with mod-max cues   2021: 10x+, 2-3  "seconds with min-mod cues   6/2/2021: 5x, min cues   6/9/2021: 10x+, min cues   6/16/2021: 10x+, min cues   6/23/2021: 5x, min cues      STG 1b: Corey will demonstrate joint attention during sensory motor play interactions for 30 seconds 3x per session for 3 consecutive sessions.    STATUS: PROGRESSING   6/2/2021: SEE ABOVE   6/9/2021: 10x+, min cues   6/16/2021: 10x+, min cues   6/23/2021: 5x, min cues      STG 1c: Corey will engage in \"peek-a-thomas\" play with a play partner 1 x per session for 3 consecutive sessions.    STATUS: PROGRESSING   5/12/2021: 0x, max cues (fleeting eye contact observed)   5/19/2021: 0x, did attend to \"peek-a-thomas\" play but did not actively participate   6/2/2021: 0X, attended to peek-thomas but did not actively participate    6/9/2021: 3x, max cues- watches but does not try to cover his own face   6/16/2021: 0x, max cues   6/23/2021: 0x, max cues      STG 1d: Corey will engage in turn taking play with a play partner 1x per session for 3 consecutive sessions.    STATUS: PROGRESSING   5/12/2021: 2x, max cues   5/19/2021: 5x+, max cues-facilitated by the clinician   6/2/2021: 5x+, mod cues   6/9/2021: 5x+, mod cues   6/16/2021: 10x+, mod cues   6/23/2021: 10x, mod cues      TREATMENT: Speech Therapy    2. Receptive Language   LTG 2: Corey will demonstrate 12 months growth in the are of receptive language in 12 chronological months.    STATUS:  PROGRESSING      STG 2a: Corey will point to a desired object or picture in 3 of 5 opportunities for 3 consecutive sessions.    STATUS:  PROGRESSING   5/12/2021: 0x, max cues   5/19/2021: 0x, max cues   6/2/2021: not addressed   6/9/2021: 0x, max cues, Dad reported increased pointing at home   6/16/2021: 0x, max cues   6/23/2021: 0x, max cues      TREATMENT: Speech Therapy    3. Expressive Language    LTG 3: Corey will demonstrate 12 months growth in the are of expressive language in 12 chronological months.    STATUS:  PROGRESSING      STG 3a: Corey will imitate " "environmental sounds 1x per session for 3 consecutive sessions.    STATUS:  PROGRESSING   5/12/2021: 0x, max cues   5/19/2021: 0x, max cues   6/2/2021: 0x, max cues   6/9/2021: 0x, max cues-animal sounds and function words \"more\", \"all done\", \"mine\".   6/16/2021: 0x animal sounds   6/23/2021: 0x, max cues      STG 3b: Corey will imitate environmental sounds 3x per session for 3 consecutive sessions.    STATUS: NOT YET ADDRESSED     STG 3c: Corey will point, exchange a picture, or use a sign language sign to request a desired object or action 3x per session    STATUS: PROGRESSING   5/12/2021: 0x, max cues   5/19/2021: 0x, max cues   6/2/2021: 0x, max cues   6/9/2021: 0x, max cues observed, parent reports pointing increased at home   6/16/2021: 0x, max cues   6/23/2021: 0x, max cues      4. Home Carryover Program    LTG 4: Caregivers will complete home activities weekly as instructed by speech and language clinician.    STATUS:  PROGRESSING      STG 4a: Caregiver will arrange for a complete audiological evaluation to rule out hearing impairment as the cause for Corey's communication delays.    STATUS:  GOAL MET   5/12/2021: Per parent report audiological evaluation scheduled for next Wednesday 5/19/21 5/19/2021: Audiological evaluation completed-awaiting report     STG 4b: Caregiver will arrange for an occupational therapy evaluation to rule out sensory motor dysfunction as a contributing factor for Corey's communication delays.      STATUS: GOAL MET   5/12/2021: Occupational therapy evaluation scheduled at this clinic in the upcoming weeks-COMPLETED      Assessment     • Patient is progressing with targeted goals to facilitate increased receptive language skills (understanding what is said to him), expressive language skills (communicating their wants and needs to others with gestures, AAC or spoken language) and pragmatic skills (how they interact and communicate socially with others) to communicate effectively with " medical professionals and communication partners in all activities of daily living across all settings.      Plan     • Continue with speech and language therapy to allow for improved independence in communicating wants and needs during ADLs per patient's plan of care.      Billed Treatment Time    • Un-timed Minutes: 45  • Timed Minutes: 0    o Total Time Calculation: 45        Today's treatment provided by:      Serena De Souza MA, CCC/SLP  6/23/2021      I will START or STAY ON the medications listed below when I get home from the hospital:    ibuprofen 600 mg oral tablet  -- 1 tab(s) by mouth every 6 hours  -- Indication: For Pain    acetaminophen 325 mg oral tablet  -- 3 tab(s) by mouth every 6 hours  -- Indication: For Pain    Prenatal 1 oral capsule  -- orally once a day  -- Indication: For Health

## 2025-02-14 NOTE — DISCHARGE NOTE OB - FINANCIAL ASSISTANCE
[facility] provides services at a reduced cost to those who are determined to be eligible through [facility]’s financial assistance program. Information regarding [facility]’s financial assistance program can be found by going to [email or web address] or by calling [phone].

## 2025-02-14 NOTE — DISCHARGE NOTE OB - PATIENT PORTAL LINK FT
You can access the FollowMyHealth Patient Portal offered by [facility] by registering at the following website: [email or web address]. By joining [employer]’s FollowMyHealth portal, you will also be able to view your health information using other applications (apps) compatible with our system.

## 2025-02-14 NOTE — PROGRESS NOTE ADULT - ASSESSMENT
A/P: 35yo POD#2 s/p LTCS.  Patient is stable and doing well post-operatively.    #anemia  -  w/250 cc addl bleeding in recovery room  - Methergine series (2/12-), s/p cytoPR  - Hct: 29.9->21.9->1u pRBCs->27.2  - Pt denying anemia symptoms  - Cw symptom monitoring    #pLTCS  - Continue regular diet.  - Increase ambulation.  - Continue motrin, tylenol, oxycodone PRN for pain control.     Shantanu Estrada PGY1

## 2025-02-14 NOTE — PROGRESS NOTE ADULT - ATTENDING COMMENTS
Pt is s/p  section. She is doing well without complaints. Denies HA, lightheadedness, dizziness, CP, SOB, palpitations, abdominal pain, heavy vaginal bleeding.     T(C): 36.8 (25 @ 05:59), Max: 36.8 (25 @ 13:15)  HR: 82 (25 @ 05:59) (82 - 102)  BP: 105/70 (25 @ 05:59) (105/70 - 131/87)  RR: 18 (25 @ 05:59) (18 - 18)  SpO2: 97% (25 @ 05:59) (95% - 99%)    Physical exam:   Abd: NTND, fundus firm and well contracted, incision CDI  VE: Appropriate vaginal bleeding    Plan  -Continue routine post partum care  -Monitor VS  -Agree with above plan/examination    timmy
Agree with assessment and plan. Pt doing well without complaints.   Vitals stable. Exam Benign  - Post op anemia- asymptomatic. Will txf 1U PRBCs. R/B/A a discussed  - Routine post partum care

## 2025-02-14 NOTE — DISCHARGE NOTE OB - HOSPITAL COURSE
Patient had uncomplicated low transverse  section.  Please see operative note for details.  During postpartum course patient's vitals were stable, vaginal bleeding appropriate, and pain well controlled.  Post operation day one hematocrit was low received 1 uprbc with adequate raise.  On day of discharge patient was ambulating, her pain controlled with oral medications, had adequate oral intake, and was voiding freely.  Discharge instructions and precautions were given.  Will return to the office in 2-3 weeks for incision check

## 2025-02-21 ENCOUNTER — INPATIENT (INPATIENT)
Facility: HOSPITAL | Age: 35
LOS: 0 days | Discharge: HOME | DRG: 761 | End: 2025-02-22
Attending: STUDENT IN AN ORGANIZED HEALTH CARE EDUCATION/TRAINING PROGRAM | Admitting: STUDENT IN AN ORGANIZED HEALTH CARE EDUCATION/TRAINING PROGRAM
Payer: COMMERCIAL

## 2025-02-21 VITALS
SYSTOLIC BLOOD PRESSURE: 138 MMHG | HEART RATE: 100 BPM | WEIGHT: 223.99 LBS | HEIGHT: 64.5 IN | DIASTOLIC BLOOD PRESSURE: 95 MMHG | TEMPERATURE: 98 F | OXYGEN SATURATION: 99 % | RESPIRATION RATE: 18 BRPM

## 2025-02-21 DIAGNOSIS — K08.409 PARTIAL LOSS OF TEETH, UNSPECIFIED CAUSE, UNSPECIFIED CLASS: Chronic | ICD-10-CM

## 2025-02-21 DIAGNOSIS — Z98.890 OTHER SPECIFIED POSTPROCEDURAL STATES: Chronic | ICD-10-CM

## 2025-02-21 DIAGNOSIS — N93.9 ABNORMAL UTERINE AND VAGINAL BLEEDING, UNSPECIFIED: ICD-10-CM

## 2025-02-21 LAB
ALBUMIN SERPL ELPH-MCNC: 3.3 G/DL — SIGNIFICANT CHANGE UP (ref 3.3–5)
ALP SERPL-CCNC: 130 U/L — HIGH (ref 40–120)
ALT FLD-CCNC: 10 U/L — SIGNIFICANT CHANGE UP (ref 10–45)
ANION GAP SERPL CALC-SCNC: 11 MMOL/L — SIGNIFICANT CHANGE UP (ref 5–17)
AST SERPL-CCNC: 12 U/L — SIGNIFICANT CHANGE UP (ref 10–40)
BASOPHILS # BLD AUTO: 0.03 K/UL — SIGNIFICANT CHANGE UP (ref 0–0.2)
BASOPHILS # BLD AUTO: 0.06 K/UL — SIGNIFICANT CHANGE UP (ref 0–0.2)
BASOPHILS NFR BLD AUTO: 0.2 % — SIGNIFICANT CHANGE UP (ref 0–2)
BASOPHILS NFR BLD AUTO: 0.4 % — SIGNIFICANT CHANGE UP (ref 0–2)
BILIRUB SERPL-MCNC: 0.2 MG/DL — SIGNIFICANT CHANGE UP (ref 0.2–1.2)
BLD GP AB SCN SERPL QL: NEGATIVE — SIGNIFICANT CHANGE UP
BUN SERPL-MCNC: 10 MG/DL — SIGNIFICANT CHANGE UP (ref 7–23)
CALCIUM SERPL-MCNC: 9.2 MG/DL — SIGNIFICANT CHANGE UP (ref 8.4–10.5)
CHLORIDE SERPL-SCNC: 107 MMOL/L — SIGNIFICANT CHANGE UP (ref 96–108)
CO2 SERPL-SCNC: 20 MMOL/L — LOW (ref 22–31)
CREAT SERPL-MCNC: 0.56 MG/DL — SIGNIFICANT CHANGE UP (ref 0.5–1.3)
DAT POLY-SP REAG RBC QL: NEGATIVE — SIGNIFICANT CHANGE UP
EGFR: 124 ML/MIN/1.73M2 — SIGNIFICANT CHANGE UP
EGFR: 124 ML/MIN/1.73M2 — SIGNIFICANT CHANGE UP
EOSINOPHIL # BLD AUTO: 0.06 K/UL — SIGNIFICANT CHANGE UP (ref 0–0.5)
EOSINOPHIL # BLD AUTO: 0.21 K/UL — SIGNIFICANT CHANGE UP (ref 0–0.5)
EOSINOPHIL NFR BLD AUTO: 0.5 % — SIGNIFICANT CHANGE UP (ref 0–6)
EOSINOPHIL NFR BLD AUTO: 1.4 % — SIGNIFICANT CHANGE UP (ref 0–6)
GAS PNL BLDV: SIGNIFICANT CHANGE UP
GAS PNL BLDV: SIGNIFICANT CHANGE UP
GLUCOSE SERPL-MCNC: 94 MG/DL — SIGNIFICANT CHANGE UP (ref 70–99)
HCT VFR BLD CALC: 23.4 % — LOW (ref 34.5–45)
HCT VFR BLD CALC: 25.4 % — LOW (ref 34.5–45)
HCT VFR BLD CALC: 26.5 % — LOW (ref 34.5–45)
HGB BLD-MCNC: 7 G/DL — CRITICAL LOW (ref 11.5–15.5)
HGB BLD-MCNC: 7.6 G/DL — LOW (ref 11.5–15.5)
HGB BLD-MCNC: 7.9 G/DL — LOW (ref 11.5–15.5)
IMM GRANULOCYTES NFR BLD AUTO: 0.6 % — SIGNIFICANT CHANGE UP (ref 0–0.9)
IMM GRANULOCYTES NFR BLD AUTO: 0.9 % — SIGNIFICANT CHANGE UP (ref 0–0.9)
LIDOCAIN IGE QN: 17 U/L — SIGNIFICANT CHANGE UP (ref 7–60)
LYMPHOCYTES # BLD AUTO: 1.93 K/UL — SIGNIFICANT CHANGE UP (ref 1–3.3)
LYMPHOCYTES # BLD AUTO: 14.9 % — SIGNIFICANT CHANGE UP (ref 13–44)
LYMPHOCYTES # BLD AUTO: 15.9 % — SIGNIFICANT CHANGE UP (ref 13–44)
LYMPHOCYTES # BLD AUTO: 2.36 K/UL — SIGNIFICANT CHANGE UP (ref 1–3.3)
MCHC RBC-ENTMCNC: 25.4 PG — LOW (ref 27–34)
MCHC RBC-ENTMCNC: 25.5 PG — LOW (ref 27–34)
MCHC RBC-ENTMCNC: 26 PG — LOW (ref 27–34)
MCHC RBC-ENTMCNC: 29.8 G/DL — LOW (ref 32–36)
MCHC RBC-ENTMCNC: 29.9 G/DL — LOW (ref 32–36)
MCHC RBC-ENTMCNC: 29.9 G/DL — LOW (ref 32–36)
MCV RBC AUTO: 84.8 FL — SIGNIFICANT CHANGE UP (ref 80–100)
MCV RBC AUTO: 85.2 FL — SIGNIFICANT CHANGE UP (ref 80–100)
MCV RBC AUTO: 87.2 FL — SIGNIFICANT CHANGE UP (ref 80–100)
MONOCYTES # BLD AUTO: 0.9 K/UL — SIGNIFICANT CHANGE UP (ref 0–0.9)
MONOCYTES # BLD AUTO: 0.98 K/UL — HIGH (ref 0–0.9)
MONOCYTES NFR BLD AUTO: 6.6 % — SIGNIFICANT CHANGE UP (ref 2–14)
MONOCYTES NFR BLD AUTO: 7 % — SIGNIFICANT CHANGE UP (ref 2–14)
NEUTROPHILS # BLD AUTO: 11.12 K/UL — HIGH (ref 1.8–7.4)
NEUTROPHILS # BLD AUTO: 9.91 K/UL — HIGH (ref 1.8–7.4)
NEUTROPHILS NFR BLD AUTO: 75.1 % — SIGNIFICANT CHANGE UP (ref 43–77)
NEUTROPHILS NFR BLD AUTO: 76.5 % — SIGNIFICANT CHANGE UP (ref 43–77)
NRBC BLD AUTO-RTO: 0 /100 WBCS — SIGNIFICANT CHANGE UP (ref 0–0)
PLATELET # BLD AUTO: 784 K/UL — HIGH (ref 150–400)
PLATELET # BLD AUTO: 791 K/UL — HIGH (ref 150–400)
PLATELET # BLD AUTO: 880 K/UL — HIGH (ref 150–400)
POTASSIUM SERPL-MCNC: 4 MMOL/L — SIGNIFICANT CHANGE UP (ref 3.5–5.3)
POTASSIUM SERPL-SCNC: 4 MMOL/L — SIGNIFICANT CHANGE UP (ref 3.5–5.3)
PROT SERPL-MCNC: 6.5 G/DL — SIGNIFICANT CHANGE UP (ref 6–8.3)
RBC # BLD: 2.76 M/UL — LOW (ref 3.8–5.2)
RBC # BLD: 2.98 M/UL — LOW (ref 3.8–5.2)
RBC # BLD: 3.04 M/UL — LOW (ref 3.8–5.2)
RBC # FLD: 15.9 % — HIGH (ref 10.3–14.5)
RBC # FLD: 15.9 % — HIGH (ref 10.3–14.5)
RBC # FLD: 16.1 % — HIGH (ref 10.3–14.5)
RH IG SCN BLD-IMP: POSITIVE — SIGNIFICANT CHANGE UP
SODIUM SERPL-SCNC: 138 MMOL/L — SIGNIFICANT CHANGE UP (ref 135–145)
WBC # BLD: 12.94 K/UL — HIGH (ref 3.8–10.5)
WBC # BLD: 13.11 K/UL — HIGH (ref 3.8–10.5)
WBC # BLD: 14.82 K/UL — HIGH (ref 3.8–10.5)
WBC # FLD AUTO: 12.94 K/UL — HIGH (ref 3.8–10.5)
WBC # FLD AUTO: 13.11 K/UL — HIGH (ref 3.8–10.5)
WBC # FLD AUTO: 14.82 K/UL — HIGH (ref 3.8–10.5)

## 2025-02-21 PROCEDURE — 99285 EMERGENCY DEPT VISIT HI MDM: CPT

## 2025-02-21 PROCEDURE — 88305 TISSUE EXAM BY PATHOLOGIST: CPT | Mod: 26

## 2025-02-21 PROCEDURE — 93976 VASCULAR STUDY: CPT | Mod: 26,59

## 2025-02-21 PROCEDURE — 76856 US EXAM PELVIC COMPLETE: CPT | Mod: 26

## 2025-02-21 PROCEDURE — 86078 PHYS BLOOD BANK SERV REACTJ: CPT

## 2025-02-21 RX ORDER — SODIUM CHLORIDE 9 G/1000ML
1000 INJECTION, SOLUTION INTRAVENOUS
Refills: 0 | Status: DISCONTINUED | OUTPATIENT
Start: 2025-02-21 | End: 2025-02-22

## 2025-02-21 RX ORDER — ACETAMINOPHEN 500 MG/5ML
975 LIQUID (ML) ORAL EVERY 6 HOURS
Refills: 0 | Status: DISCONTINUED | OUTPATIENT
Start: 2025-02-21 | End: 2025-02-22

## 2025-02-21 RX ORDER — PIPERACILLIN-TAZO-DEXTROSE,ISO 3.375G/5
3.38 IV SOLUTION, PIGGYBACK PREMIX FROZEN(ML) INTRAVENOUS ONCE
Refills: 0 | Status: COMPLETED | OUTPATIENT
Start: 2025-02-21 | End: 2025-02-21

## 2025-02-21 RX ORDER — ONDANSETRON HCL/PF 4 MG/2 ML
4 VIAL (ML) INJECTION ONCE
Refills: 0 | Status: DISCONTINUED | OUTPATIENT
Start: 2025-02-21 | End: 2025-02-21

## 2025-02-21 RX ORDER — VANCOMYCIN HCL IN 5 % DEXTROSE 1.5G/250ML
1000 PLASTIC BAG, INJECTION (ML) INTRAVENOUS ONCE
Refills: 0 | Status: COMPLETED | OUTPATIENT
Start: 2025-02-21 | End: 2025-02-21

## 2025-02-21 RX ORDER — ONDANSETRON HCL/PF 4 MG/2 ML
4 VIAL (ML) INJECTION EVERY 8 HOURS
Refills: 0 | Status: DISCONTINUED | OUTPATIENT
Start: 2025-02-21 | End: 2025-02-22

## 2025-02-21 RX ORDER — IBUPROFEN 200 MG
600 TABLET ORAL EVERY 6 HOURS
Refills: 0 | Status: DISCONTINUED | OUTPATIENT
Start: 2025-02-21 | End: 2025-02-22

## 2025-02-21 RX ORDER — FENTANYL CITRATE-0.9 % NACL/PF 100MCG/2ML
25 SYRINGE (ML) INTRAVENOUS
Refills: 0 | Status: DISCONTINUED | OUTPATIENT
Start: 2025-02-21 | End: 2025-02-21

## 2025-02-21 RX ORDER — AMOXICILLIN AND CLAVULANATE POTASSIUM 500; 125 MG/1; MG/1
875 TABLET, FILM COATED ORAL
Qty: 14 | Refills: 0
Start: 2025-02-21 | End: 2025-02-27

## 2025-02-21 RX ORDER — PIPERACILLIN-TAZO-DEXTROSE,ISO 3.375G/5
3.38 IV SOLUTION, PIGGYBACK PREMIX FROZEN(ML) INTRAVENOUS EVERY 8 HOURS
Refills: 0 | Status: COMPLETED | OUTPATIENT
Start: 2025-02-21 | End: 2025-02-22

## 2025-02-21 RX ORDER — SODIUM CHLORIDE 9 G/1000ML
1000 INJECTION, SOLUTION INTRAVENOUS
Refills: 0 | Status: DISCONTINUED | OUTPATIENT
Start: 2025-02-21 | End: 2025-02-21

## 2025-02-21 RX ORDER — ACETAMINOPHEN 500 MG/5ML
1000 LIQUID (ML) ORAL ONCE
Refills: 0 | Status: COMPLETED | OUTPATIENT
Start: 2025-02-21 | End: 2025-02-21

## 2025-02-21 RX ORDER — KETOROLAC TROMETHAMINE 30 MG/ML
30 INJECTION, SOLUTION INTRAMUSCULAR; INTRAVENOUS ONCE
Refills: 0 | Status: DISCONTINUED | OUTPATIENT
Start: 2025-02-21 | End: 2025-02-21

## 2025-02-21 RX ADMIN — Medication 0.2 MILLIGRAM(S): at 19:42

## 2025-02-21 RX ADMIN — Medication 975 MILLIGRAM(S): at 19:10

## 2025-02-21 RX ADMIN — Medication 0.2 MILLIGRAM(S): at 23:28

## 2025-02-21 RX ADMIN — Medication 600 MILLIGRAM(S): at 20:17

## 2025-02-21 RX ADMIN — KETOROLAC TROMETHAMINE 30 MILLIGRAM(S): 30 INJECTION, SOLUTION INTRAMUSCULAR; INTRAVENOUS at 03:15

## 2025-02-21 RX ADMIN — Medication 600 MILLIGRAM(S): at 21:17

## 2025-02-21 RX ADMIN — Medication 25 GRAM(S): at 17:01

## 2025-02-21 RX ADMIN — Medication 975 MILLIGRAM(S): at 23:29

## 2025-02-21 RX ADMIN — Medication 400 MILLIGRAM(S): at 10:12

## 2025-02-21 RX ADMIN — Medication 1000 MILLILITER(S): at 03:06

## 2025-02-21 RX ADMIN — Medication 975 MILLIGRAM(S): at 18:11

## 2025-02-21 RX ADMIN — SODIUM CHLORIDE 100 MILLILITER(S): 9 INJECTION, SOLUTION INTRAVENOUS at 14:42

## 2025-02-21 RX ADMIN — Medication 250 MILLIGRAM(S): at 10:42

## 2025-02-21 RX ADMIN — Medication 0.2 MILLIGRAM(S): at 15:26

## 2025-02-21 RX ADMIN — Medication 1000 MILLILITER(S): at 10:12

## 2025-02-21 RX ADMIN — Medication 200 GRAM(S): at 10:11

## 2025-02-22 VITALS
HEART RATE: 71 BPM | TEMPERATURE: 98 F | RESPIRATION RATE: 16 BRPM | DIASTOLIC BLOOD PRESSURE: 71 MMHG | SYSTOLIC BLOOD PRESSURE: 107 MMHG | OXYGEN SATURATION: 98 %

## 2025-02-22 LAB
BASOPHILS # BLD AUTO: 0.02 K/UL — SIGNIFICANT CHANGE UP (ref 0–0.2)
BASOPHILS NFR BLD AUTO: 0.1 % — SIGNIFICANT CHANGE UP (ref 0–2)
EOSINOPHIL # BLD AUTO: 0.01 K/UL — SIGNIFICANT CHANGE UP (ref 0–0.5)
EOSINOPHIL NFR BLD AUTO: 0.1 % — SIGNIFICANT CHANGE UP (ref 0–6)
HCT VFR BLD CALC: 27.3 % — LOW (ref 34.5–45)
HGB BLD-MCNC: 7.9 G/DL — LOW (ref 11.5–15.5)
IMM GRANULOCYTES NFR BLD AUTO: 0.9 % — SIGNIFICANT CHANGE UP (ref 0–0.9)
LYMPHOCYTES # BLD AUTO: 13.5 % — SIGNIFICANT CHANGE UP (ref 13–44)
LYMPHOCYTES # BLD AUTO: 2.07 K/UL — SIGNIFICANT CHANGE UP (ref 1–3.3)
MCHC RBC-ENTMCNC: 25.3 PG — LOW (ref 27–34)
MCHC RBC-ENTMCNC: 28.9 G/DL — LOW (ref 32–36)
MCV RBC AUTO: 87.5 FL — SIGNIFICANT CHANGE UP (ref 80–100)
MONOCYTES # BLD AUTO: 1 K/UL — HIGH (ref 0–0.9)
MONOCYTES NFR BLD AUTO: 6.5 % — SIGNIFICANT CHANGE UP (ref 2–14)
NEUTROPHILS # BLD AUTO: 12.04 K/UL — HIGH (ref 1.8–7.4)
NEUTROPHILS NFR BLD AUTO: 78.9 % — HIGH (ref 43–77)
NRBC BLD AUTO-RTO: 0 /100 WBCS — SIGNIFICANT CHANGE UP (ref 0–0)
PLATELET # BLD AUTO: 818 K/UL — HIGH (ref 150–400)
RBC # BLD: 3.12 M/UL — LOW (ref 3.8–5.2)
RBC # FLD: 16 % — HIGH (ref 10.3–14.5)
WBC # BLD: 15.28 K/UL — HIGH (ref 3.8–10.5)
WBC # FLD AUTO: 15.28 K/UL — HIGH (ref 3.8–10.5)

## 2025-02-22 PROCEDURE — P9040: CPT

## 2025-02-22 PROCEDURE — 85014 HEMATOCRIT: CPT

## 2025-02-22 PROCEDURE — 87040 BLOOD CULTURE FOR BACTERIA: CPT

## 2025-02-22 PROCEDURE — 83690 ASSAY OF LIPASE: CPT

## 2025-02-22 PROCEDURE — 84132 ASSAY OF SERUM POTASSIUM: CPT

## 2025-02-22 PROCEDURE — 99285 EMERGENCY DEPT VISIT HI MDM: CPT

## 2025-02-22 PROCEDURE — 82803 BLOOD GASES ANY COMBINATION: CPT

## 2025-02-22 PROCEDURE — 82330 ASSAY OF CALCIUM: CPT

## 2025-02-22 PROCEDURE — 83605 ASSAY OF LACTIC ACID: CPT

## 2025-02-22 PROCEDURE — 82435 ASSAY OF BLOOD CHLORIDE: CPT

## 2025-02-22 PROCEDURE — 86901 BLOOD TYPING SEROLOGIC RH(D): CPT

## 2025-02-22 PROCEDURE — 86922 COMPATIBILITY TEST ANTIGLOB: CPT

## 2025-02-22 PROCEDURE — 85027 COMPLETE CBC AUTOMATED: CPT

## 2025-02-22 PROCEDURE — 88305 TISSUE EXAM BY PATHOLOGIST: CPT

## 2025-02-22 PROCEDURE — 86850 RBC ANTIBODY SCREEN: CPT

## 2025-02-22 PROCEDURE — 96374 THER/PROPH/DIAG INJ IV PUSH: CPT

## 2025-02-22 PROCEDURE — 96375 TX/PRO/DX INJ NEW DRUG ADDON: CPT

## 2025-02-22 PROCEDURE — 86880 COOMBS TEST DIRECT: CPT

## 2025-02-22 PROCEDURE — 82947 ASSAY GLUCOSE BLOOD QUANT: CPT

## 2025-02-22 PROCEDURE — 86900 BLOOD TYPING SEROLOGIC ABO: CPT

## 2025-02-22 PROCEDURE — 84295 ASSAY OF SERUM SODIUM: CPT

## 2025-02-22 PROCEDURE — 80053 COMPREHEN METABOLIC PANEL: CPT

## 2025-02-22 PROCEDURE — 93976 VASCULAR STUDY: CPT

## 2025-02-22 PROCEDURE — 85018 HEMOGLOBIN: CPT

## 2025-02-22 PROCEDURE — 76856 US EXAM PELVIC COMPLETE: CPT

## 2025-02-22 PROCEDURE — 85025 COMPLETE CBC W/AUTO DIFF WBC: CPT

## 2025-02-22 PROCEDURE — 36430 TRANSFUSION BLD/BLD COMPNT: CPT

## 2025-02-22 PROCEDURE — 86923 COMPATIBILITY TEST ELECTRIC: CPT

## 2025-02-22 RX ORDER — AMOXICILLIN AND CLAVULANATE POTASSIUM 500; 125 MG/1; MG/1
875 TABLET, FILM COATED ORAL
Qty: 20 | Refills: 0
Start: 2025-02-22 | End: 2025-03-03

## 2025-02-22 RX ADMIN — Medication 975 MILLIGRAM(S): at 13:09

## 2025-02-22 RX ADMIN — Medication 975 MILLIGRAM(S): at 06:04

## 2025-02-22 RX ADMIN — Medication 975 MILLIGRAM(S): at 00:16

## 2025-02-22 RX ADMIN — Medication 0.2 MILLIGRAM(S): at 03:59

## 2025-02-22 RX ADMIN — Medication 600 MILLIGRAM(S): at 10:46

## 2025-02-22 RX ADMIN — Medication 600 MILLIGRAM(S): at 02:04

## 2025-02-22 RX ADMIN — Medication 600 MILLIGRAM(S): at 09:57

## 2025-02-22 RX ADMIN — Medication 25 GRAM(S): at 11:05

## 2025-02-22 RX ADMIN — Medication 0.2 MILLIGRAM(S): at 13:09

## 2025-02-22 RX ADMIN — Medication 0.2 MILLIGRAM(S): at 09:59

## 2025-02-22 RX ADMIN — Medication 975 MILLIGRAM(S): at 14:00

## 2025-02-22 RX ADMIN — Medication 975 MILLIGRAM(S): at 06:52

## 2025-02-22 RX ADMIN — Medication 600 MILLIGRAM(S): at 03:05

## 2025-02-22 RX ADMIN — Medication 25 GRAM(S): at 02:03

## 2025-03-06 LAB — SURGICAL PATHOLOGY STUDY: SIGNIFICANT CHANGE UP

## 2025-06-18 NOTE — OB PST NOTE - NSICDXPASTSURGICALHX_GEN_ALL_CORE_FT
Patient tolerated hydration without incident. Labs collected per order. AVS declined by patient. Patient is aware of appointment on 6/25/25 at 12:00pm.   PAST SURGICAL HISTORY:  One previous induced termination of pregnancy     Alvin teeth extracted

## (undated) DEVICE — SOL IRR POUR NS 0.9% 500ML

## (undated) DEVICE — VACUUM CURETTE BUSSE HOSP CURVED 9MM

## (undated) DEVICE — VACUUM CURETTE BERKLEY OLYMPUS CURVED 11MM

## (undated) DEVICE — PACK LITHOTOMY

## (undated) DEVICE — Device

## (undated) DEVICE — VACUUM CURETTE BERKLEY OLYMPUS CURVED 7MM

## (undated) DEVICE — VACUUM CURETTE BERKLEY OLYMPUS CURVED 9MM

## (undated) DEVICE — FOLEY TRAY 16FR 5CC LTX UMETER CLOSED

## (undated) DEVICE — DRAPE 1/2 SHEET 40X57"

## (undated) DEVICE — WARMING BLANKET UPPER ADULT

## (undated) DEVICE — DRAPE LIGHT HANDLE COVER (BLUE)

## (undated) DEVICE — DRAPE TOWEL BLUE 17" X 24"

## (undated) DEVICE — VISITEC 4X4

## (undated) DEVICE — DRSG TELFA 3 X 8

## (undated) DEVICE — VACUUM CURETTE BUSSE HOSP CURVED 14MM

## (undated) DEVICE — TUBING UTERINE ASPIRATION 3/8" X 6FT W/O ADAPTER

## (undated) DEVICE — GOWN TRIMAX LG

## (undated) DEVICE — POSITIONER FOAM EGG CRATE ULNAR 2PCS (PINK)

## (undated) DEVICE — VACUUM CURETTE BUSSE HOSP CURVED 11MM

## (undated) DEVICE — LAP PAD 18 X 18"

## (undated) DEVICE — VACUUM CURETTE BUSSE HOSP CURVED 10MM

## (undated) DEVICE — GLV 7 PROTEXIS (WHITE)

## (undated) DEVICE — VACUUM CURETTE BERKLEY OLYMPUS CURVED 8MM

## (undated) DEVICE — VACUUM CURETTE BUSSE HOSP STRAIGHT 7MM

## (undated) DEVICE — VACUUM CURETTE BERKLEY OLYMPUS F TIP 6MM

## (undated) DEVICE — VACUUM CURETTE BERKLEY OLYMPUS CURVED 16MM X 1/2"

## (undated) DEVICE — SOL IRR POUR H2O 250ML

## (undated) DEVICE — DRAPE LITHOTOMY PERI-GYN

## (undated) DEVICE — SPECIMEN CONTAINER 100ML

## (undated) DEVICE — MEDICATION LABELS W MARKER

## (undated) DEVICE — VENODYNE/SCD SLEEVE CALF MEDIUM

## (undated) DEVICE — VACUUM CURETTE MEDGYN CURVED 13MM

## (undated) DEVICE — VACUUM CURETTE MEDGYN CURVED 7MM

## (undated) DEVICE — VACUUM CURETTE BERKLEY OLYMPUS CURVED 12MM

## (undated) DEVICE — VACUUM CURETTE BUSSE HOSP CURVED 12MM

## (undated) DEVICE — GOWN LG

## (undated) DEVICE — POSITIONER PATIENT SAFETY STRAP 3X60"

## (undated) DEVICE — DRAPE LIGHT HANDLE COVER (GREEN)

## (undated) DEVICE — PREP CHLOROHEXIDINE 4% 118CC KIT

## (undated) DEVICE — SOCK SPECIMEN 3/8"-1/2" MALE PORT